# Patient Record
Sex: MALE | Race: OTHER | HISPANIC OR LATINO | Employment: FULL TIME | URBAN - METROPOLITAN AREA
[De-identification: names, ages, dates, MRNs, and addresses within clinical notes are randomized per-mention and may not be internally consistent; named-entity substitution may affect disease eponyms.]

---

## 2017-01-06 ENCOUNTER — HOSPITAL ENCOUNTER (EMERGENCY)
Facility: HOSPITAL | Age: 47
Discharge: HOME | End: 2017-01-06
Admitting: EMERGENCY MEDICINE
Payer: COMMERCIAL

## 2017-01-06 VITALS
WEIGHT: 198 LBS | TEMPERATURE: 97.9 F | HEART RATE: 81 BPM | RESPIRATION RATE: 15 BRPM | HEIGHT: 72 IN | DIASTOLIC BLOOD PRESSURE: 77 MMHG | SYSTOLIC BLOOD PRESSURE: 128 MMHG | OXYGEN SATURATION: 99 % | BODY MASS INDEX: 26.82 KG/M2

## 2017-01-06 DIAGNOSIS — B02.9 HERPES ZOSTER: Primary | ICD-10-CM

## 2017-01-06 PROCEDURE — 99282 EMERGENCY DEPT VISIT SF MDM: CPT

## 2017-01-06 RX ORDER — VALACYCLOVIR HYDROCHLORIDE 1 G/1
1000 TABLET, FILM COATED ORAL EVERY 8 HOURS
Qty: 21 TABLET | Refills: 0 | Status: SHIPPED | OUTPATIENT
Start: 2017-01-06 | End: 2017-12-26

## 2017-01-06 RX ORDER — VALACYCLOVIR HYDROCHLORIDE 500 MG/1
1000 TABLET, FILM COATED ORAL ONCE
Status: COMPLETED | OUTPATIENT
Start: 2017-01-06 | End: 2017-01-06

## 2017-01-06 RX ADMIN — VALACYCLOVIR 1000 MG: 500 TABLET, FILM COATED ORAL at 21:51

## 2017-12-26 ENCOUNTER — APPOINTMENT (EMERGENCY)
Dept: CT IMAGING | Facility: HOSPITAL | Age: 47
End: 2017-12-26
Payer: COMMERCIAL

## 2017-12-26 ENCOUNTER — HOSPITAL ENCOUNTER (OUTPATIENT)
Facility: HOSPITAL | Age: 47
Setting detail: OBSERVATION
Discharge: HOME | End: 2017-12-26
Attending: EMERGENCY MEDICINE | Admitting: FAMILY MEDICINE
Payer: COMMERCIAL

## 2017-12-26 ENCOUNTER — APPOINTMENT (OUTPATIENT)
Dept: NON INVASIVE DIAGNOSTICS | Facility: HOSPITAL | Age: 47
End: 2017-12-26
Attending: FAMILY MEDICINE
Payer: COMMERCIAL

## 2017-12-26 VITALS
BODY MASS INDEX: 26.28 KG/M2 | SYSTOLIC BLOOD PRESSURE: 110 MMHG | OXYGEN SATURATION: 97 % | HEART RATE: 71 BPM | RESPIRATION RATE: 18 BRPM | WEIGHT: 194 LBS | TEMPERATURE: 97.1 F | DIASTOLIC BLOOD PRESSURE: 73 MMHG | HEIGHT: 72 IN

## 2017-12-26 DIAGNOSIS — Z72.0 TOBACCO USE: ICD-10-CM

## 2017-12-26 DIAGNOSIS — R07.9 CHEST PAIN, UNSPECIFIED TYPE: Primary | ICD-10-CM

## 2017-12-26 PROBLEM — J45.909 ASTHMA: Status: ACTIVE | Noted: 2017-12-26

## 2017-12-26 LAB
ALBUMIN SERPL BCP-MCNC: 3.9 G/DL (ref 3.5–5)
ALP SERPL-CCNC: 65 U/L (ref 46–116)
ALT SERPL W P-5'-P-CCNC: 38 U/L (ref 12–78)
AMPHETAMINES SERPL QL SCN: NEGATIVE
ANION GAP SERPL CALCULATED.3IONS-SCNC: 9 MMOL/L (ref 4–13)
ANION GAP SERPL CALCULATED.3IONS-SCNC: 9 MMOL/L (ref 4–13)
APTT PPP: 28 SECONDS (ref 23–35)
AST SERPL W P-5'-P-CCNC: 27 U/L (ref 5–45)
ATRIAL RATE: 57 BPM
ATRIAL RATE: 61 BPM
BARBITURATES UR QL: NEGATIVE
BASOPHILS # BLD AUTO: 0.05 THOUSANDS/ΜL (ref 0–0.1)
BASOPHILS NFR BLD AUTO: 1 % (ref 0–1)
BENZODIAZ UR QL: NEGATIVE
BILIRUB SERPL-MCNC: 0.29 MG/DL (ref 0.2–1)
BUN SERPL-MCNC: 20 MG/DL (ref 5–25)
BUN SERPL-MCNC: 22 MG/DL (ref 5–25)
CALCIUM SERPL-MCNC: 8.7 MG/DL (ref 8.3–10.1)
CALCIUM SERPL-MCNC: 9.4 MG/DL (ref 8.3–10.1)
CHLORIDE SERPL-SCNC: 104 MMOL/L (ref 100–108)
CHLORIDE SERPL-SCNC: 106 MMOL/L (ref 100–108)
CHOLEST SERPL-MCNC: 180 MG/DL (ref 50–200)
CK MB SERPL-MCNC: 2.1 % (ref 0–2.5)
CK MB SERPL-MCNC: 6.1 NG/ML (ref 0–5)
CK SERPL-CCNC: 285 U/L (ref 39–308)
CO2 SERPL-SCNC: 24 MMOL/L (ref 21–32)
CO2 SERPL-SCNC: 26 MMOL/L (ref 21–32)
COCAINE UR QL: NEGATIVE
CREAT SERPL-MCNC: 0.91 MG/DL (ref 0.6–1.3)
CREAT SERPL-MCNC: 1.02 MG/DL (ref 0.6–1.3)
EOSINOPHIL # BLD AUTO: 0.28 THOUSAND/ΜL (ref 0–0.61)
EOSINOPHIL NFR BLD AUTO: 3 % (ref 0–6)
ERYTHROCYTE [DISTWIDTH] IN BLOOD BY AUTOMATED COUNT: 13.2 % (ref 11.6–15.1)
ERYTHROCYTE [DISTWIDTH] IN BLOOD BY AUTOMATED COUNT: 13.4 % (ref 11.6–15.1)
GFR SERPL CREATININE-BSD FRML MDRD: 100 ML/MIN/1.73SQ M
GFR SERPL CREATININE-BSD FRML MDRD: 87 ML/MIN/1.73SQ M
GLUCOSE P FAST SERPL-MCNC: 102 MG/DL (ref 65–99)
GLUCOSE SERPL-MCNC: 102 MG/DL (ref 65–140)
GLUCOSE SERPL-MCNC: 106 MG/DL (ref 65–140)
GLUCOSE SERPL-MCNC: 113 MG/DL (ref 65–140)
GLUCOSE SERPL-MCNC: 88 MG/DL (ref 65–140)
HCT VFR BLD AUTO: 41.7 % (ref 36.5–49.3)
HCT VFR BLD AUTO: 44.9 % (ref 36.5–49.3)
HDLC SERPL-MCNC: 34 MG/DL (ref 40–60)
HGB BLD-MCNC: 14.3 G/DL (ref 12–17)
HGB BLD-MCNC: 15.6 G/DL (ref 12–17)
INR PPP: 0.88 (ref 0.86–1.16)
LDLC SERPL CALC-MCNC: 100 MG/DL (ref 0–100)
LYMPHOCYTES # BLD AUTO: 4.61 THOUSANDS/ΜL (ref 0.6–4.47)
LYMPHOCYTES NFR BLD AUTO: 47 % (ref 14–44)
MAGNESIUM SERPL-MCNC: 1.9 MG/DL (ref 1.6–2.6)
MCH RBC QN AUTO: 31.2 PG (ref 26.8–34.3)
MCH RBC QN AUTO: 31.5 PG (ref 26.8–34.3)
MCHC RBC AUTO-ENTMCNC: 34.3 G/DL (ref 31.4–37.4)
MCHC RBC AUTO-ENTMCNC: 34.7 G/DL (ref 31.4–37.4)
MCV RBC AUTO: 91 FL (ref 82–98)
MCV RBC AUTO: 91 FL (ref 82–98)
METHADONE UR QL: NEGATIVE
MONOCYTES # BLD AUTO: 0.72 THOUSAND/ΜL (ref 0.17–1.22)
MONOCYTES NFR BLD AUTO: 8 % (ref 4–12)
NEUTROPHILS # BLD AUTO: 4 THOUSANDS/ΜL (ref 1.85–7.62)
NEUTS SEG NFR BLD AUTO: 41 % (ref 43–75)
NRBC BLD AUTO-RTO: 0 /100 WBCS
OPIATES UR QL SCN: NEGATIVE
P AXIS: 39 DEGREES
P AXIS: 40 DEGREES
PCP UR QL: NEGATIVE
PLATELET # BLD AUTO: 209 THOUSANDS/UL (ref 149–390)
PLATELET # BLD AUTO: 232 THOUSANDS/UL (ref 149–390)
PMV BLD AUTO: 10.4 FL (ref 8.9–12.7)
PMV BLD AUTO: 9.9 FL (ref 8.9–12.7)
POTASSIUM SERPL-SCNC: 3.8 MMOL/L (ref 3.5–5.3)
POTASSIUM SERPL-SCNC: 4.1 MMOL/L (ref 3.5–5.3)
PR INTERVAL: 128 MS
PR INTERVAL: 134 MS
PROT SERPL-MCNC: 8 G/DL (ref 6.4–8.2)
PROTHROMBIN TIME: 11.9 SECONDS (ref 12.1–14.4)
QRS AXIS: 74 DEGREES
QRS AXIS: 75 DEGREES
QRSD INTERVAL: 86 MS
QRSD INTERVAL: 88 MS
QT INTERVAL: 390 MS
QT INTERVAL: 404 MS
QTC INTERVAL: 392 MS
QTC INTERVAL: 393 MS
RBC # BLD AUTO: 4.58 MILLION/UL (ref 3.88–5.62)
RBC # BLD AUTO: 4.95 MILLION/UL (ref 3.88–5.62)
SODIUM SERPL-SCNC: 139 MMOL/L (ref 136–145)
SODIUM SERPL-SCNC: 139 MMOL/L (ref 136–145)
SPECIMEN SOURCE: NORMAL
T WAVE AXIS: 36 DEGREES
T WAVE AXIS: 62 DEGREES
THC UR QL: NEGATIVE
TRIGL SERPL-MCNC: 228 MG/DL
TROPONIN I BLD-MCNC: 0.01 NG/ML (ref 0–0.08)
TROPONIN I SERPL-MCNC: <0.02 NG/ML
TROPONIN I SERPL-MCNC: <0.02 NG/ML
VENTRICULAR RATE: 57 BPM
VENTRICULAR RATE: 61 BPM
WBC # BLD AUTO: 9.66 THOUSAND/UL (ref 4.31–10.16)
WBC # BLD AUTO: 9.81 THOUSAND/UL (ref 4.31–10.16)

## 2017-12-26 PROCEDURE — 85610 PROTHROMBIN TIME: CPT | Performed by: NURSE PRACTITIONER

## 2017-12-26 PROCEDURE — 74175 CTA ABDOMEN W/CONTRAST: CPT

## 2017-12-26 PROCEDURE — 71275 CT ANGIOGRAPHY CHEST: CPT

## 2017-12-26 PROCEDURE — 99285 EMERGENCY DEPT VISIT HI MDM: CPT

## 2017-12-26 PROCEDURE — 93017 CV STRESS TEST TRACING ONLY: CPT

## 2017-12-26 PROCEDURE — 85730 THROMBOPLASTIN TIME PARTIAL: CPT | Performed by: NURSE PRACTITIONER

## 2017-12-26 PROCEDURE — 96360 HYDRATION IV INFUSION INIT: CPT

## 2017-12-26 PROCEDURE — 93005 ELECTROCARDIOGRAM TRACING: CPT

## 2017-12-26 PROCEDURE — 83735 ASSAY OF MAGNESIUM: CPT | Performed by: NURSE PRACTITIONER

## 2017-12-26 PROCEDURE — 93005 ELECTROCARDIOGRAM TRACING: CPT | Performed by: NURSE PRACTITIONER

## 2017-12-26 PROCEDURE — 85027 COMPLETE CBC AUTOMATED: CPT | Performed by: PHYSICIAN ASSISTANT

## 2017-12-26 PROCEDURE — 84484 ASSAY OF TROPONIN QUANT: CPT | Performed by: PHYSICIAN ASSISTANT

## 2017-12-26 PROCEDURE — 82553 CREATINE MB FRACTION: CPT | Performed by: NURSE PRACTITIONER

## 2017-12-26 PROCEDURE — 80307 DRUG TEST PRSMV CHEM ANLYZR: CPT | Performed by: NURSE PRACTITIONER

## 2017-12-26 PROCEDURE — 84484 ASSAY OF TROPONIN QUANT: CPT

## 2017-12-26 PROCEDURE — 80053 COMPREHEN METABOLIC PANEL: CPT | Performed by: NURSE PRACTITIONER

## 2017-12-26 PROCEDURE — 85025 COMPLETE CBC W/AUTO DIFF WBC: CPT | Performed by: NURSE PRACTITIONER

## 2017-12-26 PROCEDURE — 82550 ASSAY OF CK (CPK): CPT | Performed by: NURSE PRACTITIONER

## 2017-12-26 PROCEDURE — 82948 REAGENT STRIP/BLOOD GLUCOSE: CPT

## 2017-12-26 PROCEDURE — 80061 LIPID PANEL: CPT | Performed by: PHYSICIAN ASSISTANT

## 2017-12-26 PROCEDURE — 36415 COLL VENOUS BLD VENIPUNCTURE: CPT | Performed by: NURSE PRACTITIONER

## 2017-12-26 PROCEDURE — 80048 BASIC METABOLIC PNL TOTAL CA: CPT | Performed by: PHYSICIAN ASSISTANT

## 2017-12-26 PROCEDURE — 96361 HYDRATE IV INFUSION ADD-ON: CPT

## 2017-12-26 RX ORDER — ONDANSETRON 2 MG/ML
4 INJECTION INTRAMUSCULAR; INTRAVENOUS EVERY 6 HOURS PRN
Status: DISCONTINUED | OUTPATIENT
Start: 2017-12-26 | End: 2017-12-26 | Stop reason: HOSPADM

## 2017-12-26 RX ORDER — NICOTINE 21 MG/24HR
1 PATCH, TRANSDERMAL 24 HOURS TRANSDERMAL DAILY
Qty: 28 PATCH | Refills: 0 | Status: SHIPPED | OUTPATIENT
Start: 2017-12-27 | End: 2019-04-05

## 2017-12-26 RX ORDER — NITROGLYCERIN 0.4 MG/1
0.4 TABLET SUBLINGUAL
COMMUNITY
End: 2017-12-26 | Stop reason: HOSPADM

## 2017-12-26 RX ORDER — ATORVASTATIN CALCIUM 40 MG/1
40 TABLET, FILM COATED ORAL
Status: DISCONTINUED | OUTPATIENT
Start: 2017-12-26 | End: 2017-12-26

## 2017-12-26 RX ORDER — ASPIRIN 81 MG/1
81 TABLET ORAL DAILY
Qty: 30 TABLET | Refills: 0 | Status: SHIPPED | OUTPATIENT
Start: 2017-12-26 | End: 2019-04-05

## 2017-12-26 RX ORDER — NICOTINE 21 MG/24HR
1 PATCH, TRANSDERMAL 24 HOURS TRANSDERMAL DAILY
Status: DISCONTINUED | OUTPATIENT
Start: 2017-12-26 | End: 2017-12-26 | Stop reason: HOSPADM

## 2017-12-26 RX ORDER — CALCIUM CARBONATE 200(500)MG
1000 TABLET,CHEWABLE ORAL DAILY PRN
Status: DISCONTINUED | OUTPATIENT
Start: 2017-12-26 | End: 2017-12-26 | Stop reason: HOSPADM

## 2017-12-26 RX ORDER — ASPIRIN 81 MG/1
81 TABLET, CHEWABLE ORAL DAILY
COMMUNITY
End: 2017-12-26 | Stop reason: HOSPADM

## 2017-12-26 RX ORDER — PANTOPRAZOLE SODIUM 40 MG/1
40 TABLET, DELAYED RELEASE ORAL DAILY
Qty: 14 TABLET | Refills: 0 | Status: SHIPPED | OUTPATIENT
Start: 2017-12-26 | End: 2019-04-05

## 2017-12-26 RX ORDER — ASPIRIN 81 MG/1
81 TABLET, CHEWABLE ORAL DAILY
Status: DISCONTINUED | OUTPATIENT
Start: 2017-12-26 | End: 2017-12-26 | Stop reason: HOSPADM

## 2017-12-26 RX ORDER — NITROGLYCERIN 0.4 MG/1
0.4 TABLET SUBLINGUAL
Status: DISCONTINUED | OUTPATIENT
Start: 2017-12-26 | End: 2017-12-26 | Stop reason: HOSPADM

## 2017-12-26 RX ORDER — NITROGLYCERIN 0.4 MG/1
0.4 TABLET SUBLINGUAL ONCE
Status: COMPLETED | OUTPATIENT
Start: 2017-12-26 | End: 2017-12-26

## 2017-12-26 RX ORDER — ACETAMINOPHEN 325 MG/1
650 TABLET ORAL EVERY 6 HOURS PRN
Status: DISCONTINUED | OUTPATIENT
Start: 2017-12-26 | End: 2017-12-26 | Stop reason: HOSPADM

## 2017-12-26 RX ADMIN — NITROGLYCERIN 1 INCH: 20 OINTMENT TOPICAL at 03:01

## 2017-12-26 RX ADMIN — ENOXAPARIN SODIUM 40 MG: 40 INJECTION SUBCUTANEOUS at 08:52

## 2017-12-26 RX ADMIN — IOHEXOL 100 ML: 350 INJECTION, SOLUTION INTRAVENOUS at 03:28

## 2017-12-26 RX ADMIN — SODIUM CHLORIDE 1000 ML: 0.9 INJECTION, SOLUTION INTRAVENOUS at 02:23

## 2017-12-26 RX ADMIN — ASPIRIN 81 MG 81 MG: 81 TABLET ORAL at 08:52

## 2017-12-26 RX ADMIN — NITROGLYCERIN 0.4 MG: 0.4 TABLET SUBLINGUAL at 02:17

## 2017-12-26 NOTE — CASE MANAGEMENT
Initial Clinical Review    Admission: Date/Time/Statement:  OBS 12/26 @ 0409    Orders Placed This Encounter   Procedures    Place in Observation (expected length of stay for this patient is less than two midnights)     Standing Status:   Standing     Number of Occurrences:   1     Order Specific Question:   Admitting Physician     Answer:   Mickey Howard [[id number]]     Order Specific Question:   Level of Care     Answer:   Med Surg [16]       ED: Date/Time/Mode of Arrival:   ED Arrival Information     Expected Arrival Acuity Means of Arrival Escorted By Service Admission Type    - 12/26/2017 01:55 Urgent Ambulance [facility] General Medicine Urgent    Arrival Complaint    -        Chief Complaint:   Chief Complaint   Patient presents with    Chest Pain     Pt reports chest pain started suddenly an hour ago  Pt reports he was seen at [place] Saturday for chest pain, discharged with nitroglycerin and told to follow up with cardiology  Pt reports nausea and dizziness  History of Illness: 52year old male who states was at [facility] Saturday night and was d/c Sunday  He states he was told he needed to have a stress test on 12/26/17  He states he was given nitro sl tabs to take at home if needed  He states that while playing x box tonight he developed left central chest pain that goes into his back  He states that he was nauseated  He states he took 1 sl nitro and pain went to a 3/10 pain  He states he took 2 aspirin at home and EMS had given him 3 more  Pt states he is short of breath and has pain with inspiration  He denies any hx of DVT or PE  Denies any recent travel  He states he has been getting pain in the back of both of his legs recently but denies swelling  Pt states that he thinks they saw something abnormal on his EKG  He states his friends told him to come to [facility]  + smoker    + weekend drinker but denies drinking today           ED Vital Signs:   ED Triage Vitals [12/26/17 0203]   Temperature Pulse Respirations Blood Pressure SpO2   98 2 °F (36 8 °C) 67 16 142/87 97 %      Temp Source Heart Rate Source Patient Position - Orthostatic VS BP Location FiO2 (%)   Oral Monitor Lying Right arm --      Pain Score       1        Wt Readings from Last 1 Encounters:   12/26/17 88 kg (194 lb 0 1 oz)       Abnormal Labs/Diagnostic Test Results: PT 11 9,   Abs lymphs 4 61  CTA chest/abd: No CT evidence of aortic dissection  EKG: Sinus bradycardia    ED Treatment:   Medication Administration from 12/26/2017 0154 to 12/26/2017 0443       Date/Time Order Dose Route Action Action by Comments     12/26/2017 0410 sodium chloride 0 9 % bolus 1,000 mL 0 mL Intravenous Stopped Sam Abraham RN      12/26/2017 0223 sodium chloride 0 9 % bolus 1,000 mL 1,000 mL Intravenous Toantdeloris 37 Sam Abraham [facility]      12/26/2017 0217 nitroglycerin (NITROSTAT) SL tablet 0 4 mg 0 4 mg Sublingual Given Sam Abraham RN      12/26/2017 0301 nitroglycerin (NITRO-BID) 2 % TD ointment 1 inch 1 inch Topical Given Sam Abraham RN      12/26/2017 0328 iohexol (OMNIPAQUE) 350 MG/ML injection (SINGLE-DOSE) 100 mL 100 mL Intravenous Given Yoko Galvan           Past Medical/Surgical History: Active Ambulatory Problems     Diagnosis Date Noted    No Active Ambulatory Problems     Resolved Ambulatory Problems     Diagnosis Date Noted    No Resolved Ambulatory Problems     Past Medical History:   Diagnosis Date    Asthma        Admitting Diagnosis: Chest pain [R07 9]  Tobacco use [Z72 0]  Chest pain, unspecified type [R07 9]    Age/Sex: 52 y o  male    Assessment/Plan: Chest pain  Active Problems:    Asthma    Tobacco abuse     Plan for the Primary Problem(s):  · Chest pain         ? Admit to med/surg on telemetry  trend troponin and serial EKG  Continua ASA  Consult cardiology   Order stress test       Plan for Additional Problems:   · Asthma- does not require inhalers at home  · Tobacco use- nicotine patch ordered     VTE Prophylaxis: Enoxaparin (Lovenox)  / sequential compression device   Code Status: full code  POLST: There is no POLST form on file for this patient (pre-hospital)     Anticipated Length of Stay:  Patient will be admitted on an Observation basis with an anticipated length of stay of  Less than 2 midnights  Justification for Hospital Stay: patient requires stress test and troponin    Admission Orders:  OBS  TELE  EKG with 2nd and 3rd trops  EKG with CP or st changes  Trops x 3  Consult Cardio  SCD's  Stress TEst    Scheduled Meds:   aspirin 81 mg Oral Daily   atorvastatin 40 mg Oral Daily With Dinner   enoxaparin 40 mg Subcutaneous Daily   nicotine 1 patch Transdermal Daily     Continuous Infusions:    PRN Meds:   acetaminophen    calcium carbonate    nitroglycerin    ondansetron    Trops neg  Thank you,  [address] [facility] in the [facility] by [facility] Erazo for 2017  Network Utilization Review Department  Phone: [phone]; Fax [phone]  ATTENTION: The Network Utilization Review Department is now centralized for our 7 Facilities  Make a note that we have a new phone and fax numbers for our Department  Please call with any questions or concerns to [phone] and carefully follow the prompts so that you are directed to the right person  All voicemails are confidential  Fax any determinations, approvals, denials, and requests for initial or continue stay review clinical to [phone]  Due to HIGH CALL volume, it would be easier if you could please send faxed requests to expedite your requests and in part, help us provide discharge notifications faster

## 2017-12-26 NOTE — PLAN OF CARE
CARDIOVASCULAR - ADULT     Maintains optimal cardiac output and hemodynamic stability Completed     Absence of cardiac dysrhythmias or at baseline rhythm Completed        DISCHARGE PLANNING     Discharge to home or other facility with appropriate resources Completed        INFECTION - ADULT     Absence or prevention of progression during hospitalization Completed     Absence of fever/infection during neutropenic period Completed        Knowledge Deficit     Patient/family/caregiver demonstrates understanding of disease process, treatment plan, medications, and discharge instructions Completed        PAIN - ADULT     Verbalizes/displays adequate comfort level or baseline comfort level Completed        SAFETY ADULT     Patient will remain free of falls Completed     Maintain or return to baseline ADL function Completed     Maintain or return mobility status to optimal level Completed

## 2017-12-26 NOTE — H&P
History and Physical - [facility] Internal Medicine    Patient Information: Kameron Kincaid 52 y o  male MRN: [id number]  Unit/Bed#: E4 -01 Encounter: [id number]  Admitting Physician: Doris Henao PA-C  PCP: No primary care provider on file  Date of Admission:  12/26/17    Assessment/Plan:    Hospital Problem List:     Principal Problem:    Chest pain  Active Problems:    Asthma    Tobacco abuse      Plan for the Primary Problem(s):  · Chest pain   · Admit to med/surg on telemetry  trend troponin and serial EKG  Continua ASA  Consult cardiology  Order stress test      Plan for Additional Problems:   · Asthma- does not require inhalers at home  · Tobacco use- nicotine patch ordered    VTE Prophylaxis: Enoxaparin (Lovenox)  / sequential compression device   Code Status: full code  POLST: There is no POLST form on file for this patient (pre-hospital)    Anticipated Length of Stay:  Patient will be admitted on an Observation basis with an anticipated length of stay of  Less than 2 midnights  Justification for Hospital Stay: patient requires stress test and troponin    Total Time for Visit, including Counseling / Coordination of Care: 45 minutes  Greater than 50% of this total time spent on direct patient counseling and coordination of care  Chief Complaint:   Chest pain    History of Present Illness:    Kameron Kincaid is a 52 y o  male who presents with chest pain that started around 1am while he was playing x-box  He states pain was centrally located  Did not radiate  Was associated with nausea but no diaphoresis  It was not related to food  He took 2 nitro and ASA  He did have a similar episode 2 days ago and was admitted to [facility]  He was told he needed a stress test  He has a family history of his father having an MI in his 42's that he survived  Review of Systems:    Review of Systems   Constitutional: Negative  HENT: Negative  Eyes: Negative  Respiratory: Negative  Cardiovascular: Positive for chest pain  Gastrointestinal: Positive for nausea  Endocrine: Negative  Genitourinary: Negative  Musculoskeletal: Negative  Skin: Negative  Allergic/Immunologic: Negative  Neurological: Negative  Hematological: Negative  Psychiatric/Behavioral: Negative  Past Medical and Surgical History:     Past Medical History:   Diagnosis Date    Asthma        History reviewed  No pertinent surgical history  Meds/Allergies:    Prior to Admission medications    Medication Sig Start Date End Date Taking? Authorizing Provider   aspirin 81 mg chewable tablet Chew 81 mg daily   Yes Historical Provider, MD   nitroglycerin (NITROSTAT) 0 4 mg SL tablet Place 0 4 mg under the tongue every 5 (five) minutes as needed for chest pain   Yes Historical Provider, MD   valACYclovir (VALTREX) 1,000 mg tablet Take 1 tablet by mouth every 8 (eight) hours for 7 days 1/6/17 12/26/17  Carl Thompson,      I have reviewed home medications with patient personally  Allergies:    Allergies   Allergen Reactions    Doxycycline Hives       Social History:     Marital Status: Single   Occupation:   Patient Pre-hospital Living Situation: lives with room mates  Patient Pre-hospital Level of Mobility: no restrictions  Patient Pre-hospital Diet Restrictions: none  Substance Use History:   History   Alcohol Use    Yes     Comment: social     History   Smoking Status    Current Every Day Smoker    Packs/day: 1 00   Smokeless Tobacco    Never Used     History   Drug Use No       Family History:    non-contributory    Physical Exam:     Vitals:   Blood Pressure: 97/63 (12/26/17 0500)  Pulse: (!) 52 (12/26/17 0500)  Temperature: 98 2 °F (36 8 °C) (12/26/17 0500)  Temp Source: Temporal (12/26/17 0500)  Respirations: 18 (12/26/17 0500)  Height: 6' (182 9 cm) (12/26/17 0416)  Weight - Scale: 88 kg (194 lb 0 1 oz) (12/26/17 0416)  SpO2: 97 % (12/26/17 0500)    Physical Exam Constitutional: He is oriented to person, place, and time  He appears well-developed and well-nourished  No distress  HENT:   Head: Normocephalic and atraumatic  Eyes: Conjunctivae are normal  Pupils are equal, round, and reactive to light  Neck: Normal range of motion  Neck supple  No JVD present  No tracheal deviation present  No thyromegaly present  Cardiovascular: Normal rate and regular rhythm  Pulmonary/Chest: Effort normal and breath sounds normal  No respiratory distress  He has no wheezes  Abdominal: Soft  Bowel sounds are normal  He exhibits no distension and no mass  There is no tenderness  There is no rebound and no guarding  Musculoskeletal: Normal range of motion  He exhibits no edema, tenderness or deformity  Lymphadenopathy:     He has no cervical adenopathy  Neurological: He is alert and oriented to person, place, and time  Skin: Skin is warm and dry  No rash noted  He is not diaphoretic  No erythema  No pallor  Psychiatric: He has a normal mood and affect  His behavior is normal    Vitals reviewed  Additional Data:     Lab Results: I have personally reviewed pertinent reports  Results from last 7 days  Lab Units 12/26/17  0508 12/26/17 0217   WBC Thousand/uL 9 81 9 66   HEMOGLOBIN g/dL 14 3 15 6   HEMATOCRIT % 41 7 44 9   PLATELETS Thousands/uL 209 232   NEUTROS PCT %  --  41*   LYMPHS PCT %  --  47*   MONOS PCT %  --  8   EOS PCT %  --  3       Results from last 7 days  Lab Units 12/26/17  0508 12/26/17 0217   SODIUM mmol/L 139 139   POTASSIUM mmol/L 4 1 3 8   CHLORIDE mmol/L 106 104   CO2 mmol/L 24 26   BUN mg/dL 20 22   CREATININE mg/dL 0 91 1 02   CALCIUM mg/dL 8 7 9 4   TOTAL PROTEIN g/dL  --  8 0   BILIRUBIN TOTAL mg/dL  --  0 29   ALK PHOS U/L  --  65   ALT U/L  --  38   AST U/L  --  27   GLUCOSE RANDOM mg/dL 102 113       Results from last 7 days  Lab Units 12/26/17 0217   INR  0 88       Imaging: I have personally reviewed pertinent reports  No results found  EKG, Pathology, and Other Studies Reviewed on Admission:   · EKG: no ischemic changes    Allscripts / Epic Records Reviewed: Yes     ** Please Note: This note has been constructed using a voice recognition system   **

## 2017-12-26 NOTE — ED PROVIDER NOTES
History  Chief Complaint   Patient presents with    Chest Pain     Pt reports chest pain started suddenly an hour ago  Pt reports he was seen at [facility] Saturday for chest pain, discharged with nitroglycerin and told to follow up with cardiology  Pt reports nausea and dizziness  This is a 52year old male who states was at [facility] Saturday night and was d/c Sunday  He states he was told he needed to have a stress test on 12/26/17  He states he was given nitro sl tabs to take at home if needed  He states that while playing x box tonight he developed left central chest pain that goes into his back  He states that he was nauseated  He states he took 1 sl nitro and pain went to a 3/10 pain  He states he took 2 aspirin at home and EMS had given him 3 more  Pt states he is short of breath and has pain with inspiration  He denies any hx of DVT or PE  Denies any recent travel  He states he has been getting pain in the back of both of his legs recently but denies swelling  Pt states that he thinks they saw something abnormal on his EKG  He states his friends told him to come to [facility]  + smoker  + weekend drinker but denies drinking today  History provided by:  Patient and medical records   used: No    Chest Pain   Pain location:  Substernal area  Pain quality: aching and pressure    Pain radiates to:  Mid back  Pain radiates to the back: yes    Pain severity:  Moderate  Timing:  Constant  Chronicity:  Recurrent  Context: breathing    Relieved by:  Nitroglycerin  Worsened by:  Deep breathing  Associated symptoms: back pain, diaphoresis, dizziness, nausea and shortness of breath    Risk factors: male sex and smoking    Risk factors: no prior DVT/PE and no surgery        Prior to Admission Medications   Prescriptions Last Dose Informant Patient Reported?  Taking?   aspirin 81 mg chewable tablet   Yes Yes   Sig: Chew 81 mg daily   nitroglycerin (NITROSTAT) 0 4 mg SL tablet   Yes Yes   Sig: Place 0 4 mg under the tongue every 5 (five) minutes as needed for chest pain      Facility-Administered Medications: None       Past Medical History:   Diagnosis Date    Asthma        History reviewed  No pertinent surgical history  History reviewed  No pertinent family history  I have reviewed and agree with the history as documented  Social History   Substance Use Topics    Smoking status: Current Every Day Smoker     Packs/day: 1 00    Smokeless tobacco: Never Used    Alcohol use Yes      Comment: social        Review of Systems   Constitutional: Positive for diaphoresis  HENT: Negative  Eyes: Negative  Respiratory: Positive for shortness of breath  Cardiovascular: Positive for chest pain  Gastrointestinal: Positive for nausea  Endocrine: Negative  Genitourinary: Negative  Musculoskeletal: Positive for back pain  Skin: Negative  Allergic/Immunologic: Negative  Neurological: Positive for dizziness  Hematological: Negative  Psychiatric/Behavioral: Negative  Physical Exam  ED Triage Vitals [12/26/17 0203]   Temperature Pulse Respirations Blood Pressure SpO2   98 2 °F (36 8 °C) 67 16 142/87 97 %      Temp Source Heart Rate Source Patient Position - Orthostatic VS BP Location FiO2 (%)   Oral Monitor Lying Right arm --      Pain Score       1           Orthostatic Vital Signs  Vitals:    12/26/17 0203 12/26/17 0311 12/26/17 0312   BP: 142/87 106/73 115/78   Pulse: 67     Patient Position - Orthostatic VS: Lying Lying Lying       Physical Exam   Constitutional: He appears well-developed and well-nourished  No distress  Pt clothing smells of cigarette smoke    HENT:   Head: Normocephalic and atraumatic  Eyes: EOM are normal  Pupils are equal, round, and reactive to light  Neck: Normal range of motion  Neck supple  Cardiovascular: Normal rate, regular rhythm and normal heart sounds      Pulmonary/Chest: Effort normal and breath sounds normal  No respiratory distress  He has no wheezes  He has no rales  He exhibits no tenderness  Abdominal: Soft  Bowel sounds are normal  He exhibits no distension  There is no tenderness  Musculoskeletal: Normal range of motion  Neurological: He is alert  Skin: Skin is warm and dry  Capillary refill takes less than 2 seconds  He is not diaphoretic  Psychiatric: He has a normal mood and affect  His behavior is normal  Judgment and thought content normal    Nursing note and vitals reviewed        ED Medications  Medications   sodium chloride 0 9 % bolus 1,000 mL (1,000 mL Intravenous New Bag 12/26/17 0223)   nitroglycerin (NITROSTAT) SL tablet 0 4 mg (0 4 mg Sublingual Given 12/26/17 0217)   nitroglycerin (NITRO-BID) 2 % TD ointment 1 inch (1 inch Topical Given 12/26/17 0301)   iohexol (OMNIPAQUE) 350 MG/ML injection (SINGLE-DOSE) 100 mL (100 mL Intravenous Given 12/26/17 0328)       Diagnostic Studies  Results Reviewed     Procedure Component Value Units Date/Time    Magnesium [[id number]]  (Normal) Collected:  12/26/17 0217    Lab Status:  Final result Specimen:  Blood from Arm, Left Updated:  12/26/17 0303     Magnesium 1 9 mg/dL     CKMB [[id number]]  (Abnormal) Collected:  12/26/17 0217    Lab Status:  Final result Specimen:  Blood from Arm, Left Updated:  12/26/17 0303     CK-MB Index 2 1 %      CK-MB FRACTION 6 1 (H) ng/mL     CK Total with Reflex CKMB [[id number]]  (Normal) Collected:  12/26/17 0217    Lab Status:  Final result Specimen:  Blood from Arm, Left Updated:  12/26/17 0302     Total  U/L     Comprehensive metabolic panel [[id number]] Collected:  12/26/17 0217    Lab Status:  Final result Specimen:  Blood from Arm, Left Updated:  12/26/17 0250     Sodium 139 mmol/L      Potassium 3 8 mmol/L      Chloride 104 mmol/L      CO2 26 mmol/L      Anion Gap 9 mmol/L      BUN 22 mg/dL      Creatinine 1 02 mg/dL      Glucose 113 mg/dL      Calcium 9 4 mg/dL      AST 27 U/L      ALT 38 U/L      Alkaline Phosphatase 65 U/L      Total Protein 8 0 g/dL      Albumin 3 9 g/dL      Total Bilirubin 0 29 mg/dL      eGFR 87 ml/min/1 73sq m     Narrative:         National Kidney Disease Education Program recommendations are as follows:  GFR calculation is accurate only with a steady state creatinine  Chronic Kidney disease less than 60 ml/min/1 73 sq  meters  Kidney failure less than 15 ml/min/1 73 sq  meters  Protime-INR [[id number]]  (Abnormal) Collected:  12/26/17 0217    Lab Status:  Final result Specimen:  Blood from Arm, Left Updated:  12/26/17 0238     Protime 11 9 (L) seconds      INR 0 88    APTT [[id number]]  (Normal) Collected:  12/26/17 0217    Lab Status:  Final result Specimen:  Blood from Arm, Left Updated:  12/26/17 0238     PTT 28 seconds     Narrative:          Therapeutic Heparin Range = 60-90 seconds    CBC and differential [[id number]]  (Abnormal) Collected:  12/26/17 0217    Lab Status:  Final result Specimen:  Blood from Arm, Left Updated:  12/26/17 0229     WBC 9 66 Thousand/uL      RBC 4 95 Million/uL      Hemoglobin 15 6 g/dL      Hematocrit 44 9 %      MCV 91 fL      MCH 31 5 pg      MCHC 34 7 g/dL      RDW 13 2 %      MPV 10 4 fL      Platelets 416 Thousands/uL      nRBC 0 /100 WBCs      Neutrophils Relative 41 (L) %      Lymphocytes Relative 47 (H) %      Monocytes Relative 8 %      Eosinophils Relative 3 %      Basophils Relative 1 %      Neutrophils Absolute 4 00 Thousands/µL      Lymphocytes Absolute 4 61 (H) Thousands/µL      Monocytes Absolute 0 72 Thousand/µL      Eosinophils Absolute 0 28 Thousand/µL      Basophils Absolute 0 05 Thousands/µL     POCT troponin [[id number]]  (Normal) Collected:  12/26/17 0201    Lab Status:  Final result Updated:  12/26/17 0214     POC Troponin I 0 01 ng/ml      Specimen Type VENOUS    Narrative:         [employer] i-Stat handheld analyzer 99% cutoff is > 0 08ng/mL in network Emergency Departments    o cTnI 99% cutoff is useful only when applied to patients in the clinical setting of myocardial ischemia  o cTnI 99% cutoff should be interpreted in the context of clinical history, ECG findings and possibly cardiac imaging to establish correct diagnosis  o cTnI 99% cutoff may be suggestive but clearly not indicative of a coronary event without the clinical setting of myocardial ischemia  Rapid drug screen, urine [[id number]]     Lab Status:  No result Specimen:  Urine                  CTA dissection protocol chest and abdomen    (Results Pending)              Procedures  ECG 12 Lead Documentation  Date/Time: 12/26/2017 2:28 AM  Performed by: Sagrario Roberto by: Yessica Lipscomb     Indications / Diagnosis:  Chest pain   ECG reviewed by me, the ED Provider: yes (Dr Milly Goldmann )    Patient location:  ED and bedside  Previous ECG:     Previous ECG:  Unavailable    Comparison to cardiac monitor: No    Interpretation:     Interpretation: normal    Rate:     ECG rate:  61    ECG rate assessment: normal    Rhythm:     Rhythm: sinus rhythm             Phone Contacts  ED Phone Contact    ED Course  ED Course as of Dec 26 0410   Tue Dec 26, 2017   0236 Pt got relief with nitro #2 will place nitro paste  Will have RN get BP in both arms  [id number] Labs reviewed and discussed with pt  Trop #1 0 01  Will order CTA to rule out dissection     0404 Labs and radiology results reviewed and discussed with pt  Explained to pt that concern of cardiac issue  Pt agrees and agrees with POC to stay for possible stress test today  Will speak with JAYRO Manrique 1620 will accept pt for observation status             HEART Risk Score    Flowsheet Row Most Recent Value   History  1 Filed at: 12/26/2017 0314   ECG  0 Filed at: 12/26/2017 0314   Age  1 Filed at: 12/26/2017 0314   Risk Factors  1 Filed at: 12/26/2017 0314   Troponin  0 Filed at: 12/26/2017 0314   Heart Score Risk Calculator   History  1 Filed at: 12/26/2017 0314   ECG  0 Filed at: 12/26/2017 6403   Age  1 Filed at: 2017   Risk Factors  1 Filed at: 2017   Troponin  0 Filed at: 2017   HEART Score  3 Filed at: 2017   HEART Score  3 Filed at: 2017 0255                            [facility]  Number of Diagnoses or Management Options  Diagnosis management comments: Differential diagnosis:  MI, ACS, NSTEMI, STEMI, dissection     Labs  EKG  UDS  Nitro  CTA chest  Admit          Amount and/or Complexity of Data Reviewed  Clinical lab tests: ordered and reviewed  Tests in the radiology section of CPT®: ordered and reviewed  Review and summarize past medical records: yes      CritCare Time    Disposition  Final diagnoses:   Chest pain, unspecified type   Tobacco use     Time reflects when diagnosis was documented in both MDM as applicable and the Disposition within this note     Time User Action Codes Description Comment    2017  4:05 AM Corby Merlos [R07 9] Chest pain, unspecified type     2017  4:05 AM Corby Merlos [Z72 0] Tobacco use       ED Disposition     ED Disposition Condition Comment    Admit  Case was discussed with JAYRO and the patient's admission status was agreed to be Admission Status: observation status to the service of Dr Raquel Barnes    Follow-up Information    None       Patient's Medications   Discharge Prescriptions    No medications on file     No discharge procedures on file  ED Provider  Electronically Signed by    Raymundo Bhatia  Preliminary Radiology Report  Call: [phone]  assistance Online chat: [email or web address]  Patient Name: Prudencio Ivan MRN: [id number]   (Age): 1970 52 Gender: M  Date of Exam: 2017 Accession: [id number]  Referring Physician: Cathie Cantrell # of Images: [id number]  Ordered As: CTA CHEST/ABDOMEN WWO  Page 1 of 2  EXAM:  CT Angiography Chest Without and With Intravenous Contrast  CLINICAL HISTORY:  52years old, male; Pain; Chest pain; Other: Back;  Additional info: Chest and back pain, SOB  TECHNIQUE:  Axial computed tomographic angiography images of the chest without and with intravenous contrast  using pulmonary embolism protocol  Coronal and sagittal reformatted images were created and reviewed  CONTRAST:  100 mL of omni 350 administered intravenously  COMPARISON:  No relevant prior studies available  FINDINGS:  Pulmonary arteries: There is no evidence for PE  Aorta: There is no evidence for thoracic aortic aneurysm or dissection  Lungs: Pulmonary granuloma noted  No mass  Pleural space: Unremarkable  No significant effusion  No pneumothorax  Heart: Unremarkable  No cardiomegaly  No significant pericardial effusion  No evidence of RV  dysfunction  Bones/joints: No acute fracture  No dislocation  Soft tissues: Unremarkable  Lymph nodes: Unremarkable  No enlarged lymph nodes  IMPRESSION:  There is no evidence for thoracic aortic aneurysm or dissection  _______________________________________________  EXAM:  CT Angiography Abdomen Without and With Intravenous Contrast  Vicky Bright Accession: [id number] MRN: [id number]  Preliminary Radiology Report   (QA) DISCREPANCY? If there is a discrepancy between the preliminary and final interpretation, please notify [employer] via [email or web address]  If you do not have access to our QA portal, call our QA team at [phone]  This report is intended only for the use of the referring physician, and only in accordance with law, If you received this in error, call [phone]  Page 2 of 2  EXAM DATE/TIME:  12/26/2017 3:25 AM   CLINICAL HISTORY:  52years old, male; Pain; Chest pain; Other: Back; Additional info: Chest and back pain, SOB  TECHNIQUE:  Axial computed tomographic angiography images of the abdomen without and with intravenous  contrast   Coronal and sagittal reformatted images were created and reviewed    CONTRAST:  100 mL of omni 350 administered intravenously  COMPARISON:  No relevant prior studies available  FINDINGS:  Lower thorax: No acute findings  Aorta: No acute findings  No abdominal aortic aneurysm  No dissection  Celiac trunk and mesenteric arteries: No acute findings  No occlusion or significant stenosis  Renal arteries: No acute findings  No occlusion or significant stenosis  Liver: Hepatic steatosis is present  Gallbladder and bile ducts: Unremarkable  No calcified stones  No ductal dilation  Pancreas: Unremarkable  No ductal dilation  No mass  Spleen: Unremarkable  No splenomegaly  Adrenals: Unremarkable  No mass  Kidneys and ureters: Unremarkable  No obstructing stones  No hydronephrosis  No solid mass  Stomach and bowel: Unremarkable  No obstruction  No mucosal thickening  Intraperitoneal space: Unremarkable  No significant fluid collection  No free air  Bones/joints: No acute fracture  No dislocation  Soft tissues: Unremarkable  No mass  Lymph nodes: Shotty nonspecific retroperitoneal lymphadenopathy is noted    IMPRESSION:  No acute findings       Kishor Graft, CRNP  12/26/17 0469

## 2017-12-26 NOTE — CONSULTS
Consult - Cardiology   Angelika Adarsh 52 y o  male MRN: [id number]  Unit/Bed#: E4 -01 Encounter: [id number]          Reason For Consult:  Chest pain, palpitations    History Of Present Illness: The patient is a 70-year-old male with a history of smoking 1 pack of cigarettes per day  He does have a family history of premature CAD in his father who is still living  He does not have hypertension  The patient presents with a 1 month history of sharp chest pain which is usually accompanied by palpitations  He does get lightheadedness with this  He was seen in the [facility] Emergency room recently and discharged  He was told he needed a stress test   He had similar symptoms early today and came to the emergency room  He is now pain-free  He does not necessarily get exertional chest pain or shortness of breath but does get some shortness of breath at times  He denies peripheral edema  Past Medical History:   Low HDL cholesterol  Smoking history      Allergy:  Allergies   Allergen Reactions    Doxycycline Hives       Medications:  None    Family History:  Premature CAD in the patient's father    Social History:  Smokes 1 pack of cigarettes per day  Occasional alcohol use    ROS:  No TIAs or claudication    Exam:  103/63  Pulse 60  General:  Anxious middle-age male in no acute distress  Head: Normocephalic, atraumatic  Eyes:  No Icterus  Normal Conjunctiva  Oropharynx: normal-appearing mucosa and no pharyngitis, no exudate  Neck: supple, symmetrical, trachea midline, thyroid: not enlarged, symmetric, no tenderness/mass/nodules, no carotid bruit and no JVD   Heart: RRR, No: murmer, rub or gallop,   Lungs: normal air entry, lungs clear to auscultation and no rales, rhonchi or wheezing  Abdomen:  Nontender without mass organomegaly  Lower Limbs:  No edema   Pulses intact    EKG:  Normal  Troponins:  Normal  Potassium 4 1, BUN 20, creatinine 0 91  Hemoglobin 14 3, white blood count 9 81, platelets [id number]  CT of the chest-normal      ASSESSMENT AND PLAN:   1  Atypical chest pain  Doubt angina  Stress test for later today  Troponins are normal as is the patient's EKG  Does have palpitations with the pain which raises the possibility of an arrhythmia induced chest pain although this is unlikely  If stress test is negative with discharge home  With set patient up for 24 hour Holter monitor and copy the results to me  Will not set up formal follow-up with him unless Holter monitor would be abnormal   2   Low HDL cholesterol  Risk of myocardial infarction/stroke in the next 10 years is 5 7%  This does not indicate statin therapy  Will discontinue atorvastatin  Obviously of CAD present will certainly reinstitute statin  Even know he is a bit shy of 48years of age, would advise use of enteric-coated aspirin 81 mg daily  Also would consider use of empiric proton pump inhibitor for 10-14 days although he does not report symptoms of GERD        Martha Weir MD

## 2017-12-26 NOTE — DISCHARGE SUMMARY
Discharge Summary - [address] [facility]    Patient Information: Crow Jorge 52 y o  male MRN: [id number]  Unit/Bed#: E4 -01 Encounter: [id number]    Discharging Physician / Practitioner: Giuliana Romo MD  PCP: No primary care provider on file  Admission Date: 12/26/2017  Discharge Date: 12/26/17    Reason for Admission: chest pain    Discharge Diagnoses:     Principal Problem:    Chest pain  Active Problems:    Asthma    Tobacco abuse  Resolved Problems:    * No resolved hospital problems  *      Consultations During Hospital Stay:  · Cardiology    Procedures Performed:     · Exercise stress test:  -  Stress results: Duration of exercise was 11 min  Target heart rate was achieved  There was no chest pain during stress  -  ECG conclusions: The stress ECG was negative for ischemia  Significant Findings / Test Results:     · Cholesterol total 180, triglycerides 228, HDL 34,     Incidental Findings:   · None     Test Results Pending at Discharge (will require follow up): · None     Outpatient Tests Requested:  · 24 hour Holter monitor    Complications:  None    Hospital Course:     Crow Jorge is a 52 y o  male patient who originally presented to the hospital on 12/26/2017 due to acute left-sided chest pain  Cardiac workup was initiated including telemetry monitoring and serial troponin  The workup was negative acute coronary syndrome  Given risk factors that include patient's age and sex, tobacco use and low HDL, patient underwent an exercise stress test which was negative for ischemia  Given an atypical presentation of patient's chest pain possibility gastric reflux cannot be excluded, therefore, the patient was discharged on an empiric treatment of 2 week course Protonix    Given proximity of patient's age to 48 he was also recommended to continue aspirin which he had started prior to admission to the hospital   To completely rule out the possibility of a arrhythmia, the patient was also instructed to have 24 hour Holter monitor with results sent to a cardiologist Dr Jazmin Bunch for interpretation  If now events are recorded, the patient does not need to follow up with Cardiology  He will be contacted by Cardiology regarding this  At the time of the discharge the patient had normal and stable vital signs, normal oxygen saturation on room air, and good oral intake  He was instructed to establish care with primary care provider as soon as possible  He verbalized understanding  His prescriptions were electronically sent to his pharmacy  Condition at Discharge: good     Discharge Day Visit / Exam:     Subjective:  Patient seen and examined  He is reporting still time residual left-sided dull pain and the left side of the chest   He denies heartburn or history GERD  He denies cough or shortness of breath  He denies lightheadedness  He denies abdominal pain, nausea, vomiting, diarrhea or constipation  He reports occasional palpitations not currently  Vitals: Blood Pressure: 110/73 (12/26/17 1429)  Pulse: 71 (12/26/17 1429)  Temperature: (!) 97 1 °F (36 2 °C) (12/26/17 1429)  Temp Source: Tympanic (12/26/17 1429)  Respirations: 18 (12/26/17 1429)  Height: 6' (182 9 cm) (12/26/17 0416)  Weight - Scale: 88 kg (194 lb 0 1 oz) (12/26/17 0416)  SpO2: 97 % (12/26/17 1429)  Exam:     Physical Exam   Constitutional: He is oriented to person, place, and time  He appears well-developed and well-nourished  HENT:   Head: Normocephalic  Eyes: Conjunctivae are normal  Pupils are equal, round, and reactive to light  Neck: Normal range of motion  Neck supple  No JVD present  Cardiovascular: Normal rate and regular rhythm  Exam reveals no gallop and no friction rub  No murmur heard  Pulmonary/Chest: Effort normal  No respiratory distress  He has no wheezes  He has no rales  Abdominal: Soft  He exhibits no distension  There is no tenderness  There is no guarding  Musculoskeletal: He exhibits no edema  Neurological: He is alert and oriented to person, place, and time  Skin: Skin is warm and dry  Discussion with Family: Patient only    Discharge instructions/Information to patient and family:   See after visit summary for information provided to patient and family  Provisions for Follow-Up Care:  See after visit summary for information related to follow-up care and any pertinent home health orders  Disposition:     Home    For Discharges to [facility] SNF:   · Not Applicable to this Patient - Not Applicable to this Patient    Planned Readmission: No     Discharge Statement:  I spent 45 minutes discharging the patient  This time was spent on the day of discharge  I had direct contact with the patient on the day of discharge  Greater than 50% of the total time was spent examining patient, answering all patient questions, arranging and discussing plan of care with patient as well as directly providing post-discharge instructions  Additional time then spent on discharge activities  Discharge Medications:  See after visit summary for reconciled discharge medications provided to patient and family        ** Please Note: This note has been constructed using a voice recognition system **

## 2017-12-26 NOTE — PLAN OF CARE
Problem: PAIN - ADULT  Goal: Verbalizes/displays adequate comfort level or baseline comfort level  Interventions:  - Encourage patient to monitor pain and request assistance  - Assess pain using appropriate pain scale  - Administer analgesics based on type and severity of pain and evaluate response  - Implement non-pharmacological measures as appropriate and evaluate response  - Consider cultural and social influences on pain and pain management  - Notify physician/advanced practitioner if interventions unsuccessful or patient reports new pain  Outcome: Progressing      Problem: INFECTION - ADULT  Goal: Absence or prevention of progression during hospitalization  INTERVENTIONS:  - Assess and monitor for signs and symptoms of infection  - Monitor lab/diagnostic results  - Monitor all insertion sites, i e  indwelling lines, tubes, and drains  - Monitor endotracheal (as able) and nasal secretions for changes in amount and color  - [place] appropriate cooling/warming therapies per order  - Administer medications as ordered  - Instruct and encourage patient and family to use good hand hygiene technique  - Identify and instruct in appropriate isolation precautions for identified infection/condition  Outcome: Progressing    Goal: Absence of fever/infection during neutropenic period  INTERVENTIONS:  - Monitor WBC  - Implement neutropenic guidelines  Outcome: Progressing      Problem: SAFETY ADULT  Goal: Patient will remain free of falls  INTERVENTIONS:  - Assess patient frequently for physical needs  -  Identify cognitive and physical deficits and behaviors that affect risk of falls    -  [place] fall precautions as indicated by assessment   - Educate patient/family on patient safety including physical limitations  - Instruct patient to call for assistance with activity based on assessment  - Modify environment to reduce risk of injury  - Consider OT/PT consult to assist with strengthening/mobility  Outcome: Progressing    Goal: Maintain or return to baseline ADL function  INTERVENTIONS:  -  Assess patient's ability to carry out ADLs; assess patient's baseline for ADL function and identify physical deficits which impact ability to perform ADLs (bathing, care of mouth/teeth, toileting, grooming, dressing, etc )  - Assess/evaluate cause of self-care deficits   - Assess range of motion  - Assess patient's mobility; develop plan if impaired  - Assess patient's need for assistive devices and provide as appropriate  - Encourage maximum independence but intervene and supervise when necessary  ¯ Involve family in performance of ADLs  ¯ Assess for home care needs following discharge   ¯ Request OT consult to assist with ADL evaluation and planning for discharge  ¯ Provide patient education as appropriate  Outcome: Progressing    Goal: Maintain or return mobility status to optimal level  INTERVENTIONS:  - Assess patient's baseline mobility status (ambulation, transfers, stairs, etc )    - Identify cognitive and physical deficits and behaviors that affect mobility  - Identify mobility aids required to assist with transfers and/or ambulation (gait belt, sit-to-stand, lift, walker, cane, etc )  - [place] fall precautions as indicated by assessment  - Record patient progress and toleration of activity level on Mobility SBAR; progress patient to next Phase/Stage  - Instruct patient to call for assistance with activity based on assessment  - Request Rehabilitation consult to assist with strengthening/weightbearing, etc   Outcome: Progressing      Problem: DISCHARGE PLANNING  Goal: Discharge to home or other facility with appropriate resources  INTERVENTIONS:  - Identify barriers to discharge w/patient and caregiver  - Arrange for needed discharge resources and transportation as appropriate  - Identify discharge learning needs (meds, wound care, etc )  - Arrange for interpretive services to assist at discharge as needed  - Refer to Case Management Department for coordinating discharge planning if the patient needs post-hospital services based on physician/advanced practitioner order or complex needs related to functional status, cognitive ability, or social support system  Outcome: Progressing      Problem: Knowledge Deficit  Goal: Patient/family/caregiver demonstrates understanding of disease process, treatment plan, medications, and discharge instructions  Complete learning assessment and assess knowledge base  Interventions:  - Provide teaching at level of understanding  - Provide teaching via preferred learning methods  Outcome: Progressing      Problem: CARDIOVASCULAR - ADULT  Goal: Maintains optimal cardiac output and hemodynamic stability  INTERVENTIONS:  - Monitor I/O, vital signs and rhythm  - Monitor for S/S and trends of decreased cardiac output i e  bleeding, hypotension  - Administer and titrate ordered vasoactive medications to optimize hemodynamic stability  - Assess quality of pulses, skin color and temperature  - Assess for signs of decreased coronary artery perfusion - ex   Angina  - Instruct patient to report change in severity of symptoms  Outcome: Progressing    Goal: Absence of cardiac dysrhythmias or at baseline rhythm  INTERVENTIONS:  - Continuous cardiac monitoring, monitor vital signs, obtain 12 lead EKG if indicated  - Administer antiarrhythmic and heart rate control medications as ordered  - Monitor electrolytes and administer replacement therapy as ordered  Outcome: Progressing

## 2018-03-24 LAB
ABSOL LYMPHOCYTES (HISTORICAL): 2.6 K/UL (ref 0.5–4)
ALBUMIN SERPL BCP-MCNC: 4.3 G/DL (ref 3–5.2)
ALP SERPL-CCNC: 61 U/L (ref 43–122)
ALT SERPL W P-5'-P-CCNC: 30 U/L (ref 9–52)
ANION GAP SERPL CALCULATED.3IONS-SCNC: 12 MMOL/L (ref 5–14)
AST SERPL W P-5'-P-CCNC: 29 U/L (ref 17–59)
BASOPHILS # BLD AUTO: 0.1 K/UL (ref 0–0.1)
BASOPHILS # BLD AUTO: 1 % (ref 0–1)
BILIRUB SERPL-MCNC: 0.5 MG/DL
BUN SERPL-MCNC: 15 MG/DL (ref 5–25)
CALCIUM SERPL-MCNC: 9.7 MG/DL (ref 8.4–10.2)
CHLORIDE SERPL-SCNC: 106 MEQ/L (ref 97–108)
CHOLEST SERPL-MCNC: 197 MG/DL
CHOLEST/HDLC SERPL: 5.1 {RATIO}
CO2 SERPL-SCNC: 25 MMOL/L (ref 22–30)
CREATINE, SERUM (HISTORICAL): 1 MG/DL (ref 0.7–1.5)
DEPRECATED RDW RBC AUTO: 13.3 %
EGFR (HISTORICAL): >60 ML/MIN/1.73 M2
EOSINOPHIL # BLD AUTO: 0.2 K/UL (ref 0–0.4)
EOSINOPHIL NFR BLD AUTO: 3 % (ref 0–6)
GLUCOSE FASTING (HISTORICAL): 96 MG/DL (ref 70–99)
HCT VFR BLD AUTO: 43.6 % (ref 41–53)
HDLC SERPL-MCNC: 39 MG/DL
HGB BLD-MCNC: 14.6 G/DL (ref 13.5–17.5)
HIV 1/2 AB DIFFERENTIATION (HISTORICAL): NEGATIVE
LDL/HDL RATIO (HISTORICAL): 3.4
LDLC SERPL CALC-MCNC: 133 MG/DL
LYMPHOCYTES NFR BLD AUTO: 34 % (ref 25–45)
MCH RBC QN AUTO: 30.3 PG (ref 26–34)
MCHC RBC AUTO-ENTMCNC: 33.5 % (ref 31–36)
MCV RBC AUTO: 90 FL (ref 80–100)
MONOCYTES # BLD AUTO: 0.5 K/UL (ref 0.2–0.9)
MONOCYTES NFR BLD AUTO: 7 % (ref 1–10)
NEUTROPHILS ABS COUNT (HISTORICAL): 4.3 K/UL (ref 1.8–7.8)
NEUTS SEG NFR BLD AUTO: 55 % (ref 45–65)
PLATELET # BLD AUTO: 276 K/MCL (ref 150–450)
POTASSIUM SERPL-SCNC: 4.2 MEQ/L (ref 3.6–5)
RBC # BLD AUTO: 4.82 M/MCL (ref 4.5–5.9)
SODIUM SERPL-SCNC: 143 MEQ/L (ref 137–147)
TOTAL PROTEIN (HISTORICAL): 7.3 G/DL (ref 5.9–8.4)
TRIGL SERPL-MCNC: 125 MG/DL
VLDLC SERPL CALC-MCNC: 25 MG/DL (ref 0–40)
WBC # BLD AUTO: 7.8 K/MCL (ref 4.5–11)

## 2018-04-04 LAB
COMMENT (HISTORICAL): NORMAL
INFLUENZA A (VIRAL ID) (HISTORICAL): ABNORMAL
INFLUENZA B (VIRAL ID) (HISTORICAL): DETECTED

## 2018-04-08 LAB — C DIFFICILE TOXIN GENE NAA (HISTORICAL): NORMAL

## 2018-04-16 LAB
ARRHY DURING EX: NORMAL
CHEST PAIN STATEMENT: NORMAL
MAX DIASTOLIC BP: 57 MMHG
MAX HEART RATE: 160 BPM
MAX PREDICTED HEART RATE: 173 BPM
MAX. SYSTOLIC BP: 150 MMHG
PROTOCOL NAME: NORMAL
TARGET HR FORMULA: NORMAL
TEST INDICATION: NORMAL
TIME IN EXERCISE PHASE: NORMAL

## 2019-01-28 ENCOUNTER — TELEPHONE (OUTPATIENT)
Dept: FAMILY MEDICINE CLINIC | Facility: CLINIC | Age: 49
End: 2019-01-28

## 2019-01-28 ENCOUNTER — OFFICE VISIT (OUTPATIENT)
Dept: FAMILY MEDICINE CLINIC | Facility: CLINIC | Age: 49
End: 2019-01-28

## 2019-01-28 VITALS
HEIGHT: 72 IN | SYSTOLIC BLOOD PRESSURE: 160 MMHG | TEMPERATURE: 97.2 F | HEART RATE: 98 BPM | DIASTOLIC BLOOD PRESSURE: 88 MMHG | WEIGHT: 193 LBS | BODY MASS INDEX: 26.14 KG/M2 | RESPIRATION RATE: 18 BRPM | OXYGEN SATURATION: 98 %

## 2019-01-28 DIAGNOSIS — H10.9 BACTERIAL CONJUNCTIVITIS: ICD-10-CM

## 2019-01-28 DIAGNOSIS — B00.9 HERPES: Primary | ICD-10-CM

## 2019-01-28 DIAGNOSIS — R05.9 COUGH: ICD-10-CM

## 2019-01-28 PROBLEM — H11.001 PTERYGIUM EYE, RIGHT: Status: ACTIVE | Noted: 2019-01-28

## 2019-01-28 PROBLEM — J06.9 URI (UPPER RESPIRATORY INFECTION): Status: ACTIVE | Noted: 2019-01-28

## 2019-01-28 PROBLEM — B96.89 BACTERIAL CONJUNCTIVITIS OF BOTH EYES: Status: ACTIVE | Noted: 2019-01-28

## 2019-01-28 PROCEDURE — 99213 OFFICE O/P EST LOW 20 MIN: CPT | Performed by: FAMILY MEDICINE

## 2019-01-28 RX ORDER — VALACYCLOVIR HYDROCHLORIDE 500 MG/1
500 TABLET, FILM COATED ORAL DAILY
Qty: 30 TABLET | Refills: 2 | Status: SHIPPED | OUTPATIENT
Start: 2019-01-28 | End: 2019-04-05 | Stop reason: SDUPTHER

## 2019-01-28 RX ORDER — VALACYCLOVIR HYDROCHLORIDE 500 MG/1
TABLET, FILM COATED ORAL
COMMUNITY
Start: 2018-03-26 | End: 2019-01-28 | Stop reason: SDUPTHER

## 2019-01-28 RX ORDER — POLYMYXIN B SULFATE AND TRIMETHOPRIM 1; 10000 MG/ML; [USP'U]/ML
1 SOLUTION OPHTHALMIC EVERY 6 HOURS
Qty: 10 ML | Refills: 0 | Status: SHIPPED | OUTPATIENT
Start: 2019-01-28 | End: 2019-04-05

## 2019-01-28 NOTE — PROGRESS NOTES
Assessment/Plan:    URI (upper respiratory infection)  Discussed that symptoms are likely cause by virus  Discussed importance of increasing fluid intake and getting plenty of rest   Discussed supportive care with use of OTC cough drops and OTC cough syrup  Symptoms may improve with home humidifier use or exposure to steam from hot shower  Sinus irrigation and warm saltwater gargles may also provide relief  Educated on hand hygiene to prevent spread  Minimize prolonged close contact others while ill  Continue to monitor symptoms closely and call the office if symptoms worsen or do not improve  Will give Mucinex twice a day for 10 days      Bacterial conjunctivitis of both eyes  His allergic to Doxycyline  Will give Polytrim eyedrops for 10 days  Encouraged washing hands frequently as condition is very contagious    Herpes  Has more than 4 flares in a year  Requesting to go on suppressive medication  Will give Valtrex 500 milligram daily    Pterygium eye, right  Encouraged to see ophthalmologist as patient states lesion is increasing in size  I can see that it has extended over the right pupil at this time       Diagnoses and all orders for this visit:    Herpes  -     valACYclovir (VALTREX) 500 mg tablet; Take 1 tablet (500 mg total) by mouth daily for 360 days    Bacterial conjunctivitis  -     polymyxin b-trimethoprim (POLYTRIM) ophthalmic solution; Administer 1 drop to both eyes every 6 (six) hours    Cough  -     dextromethorphan-guaifenesin (MUCINEX DM)  MG per 12 hr tablet; Take 1 tablet by mouth every 12 (twelve) hours    Other orders  -     Discontinue: valACYclovir (VALTREX) 500 mg tablet; take 1 tablet by oral route 2 times every day for 3 days within 24 hours of onset          Subjective:      Patient ID: Jesús Berger is a 50 y o  male  HPI  Patient is here due to cough, congestion and sore throat started on Thursday  He feels that he has sinuses are full of mucus    And he tried taking NyQuil without much improvement  Denies any chest pain or fever however reports occasional chills  He is also complaining of bilateral eye discharge when he wakes up in the morning  He noticed thick green is discharged on eyelids bilaterally    The following portions of the patient's history were reviewed and updated as appropriate: allergies, current medications, past family history, past medical history, past social history, past surgical history and problem list     Review of Systems   Constitutional: Positive for chills  Negative for fatigue and fever  HENT: Positive for congestion, rhinorrhea and sore throat  Negative for ear discharge and sneezing  Eyes: Positive for discharge  Negative for pain and visual disturbance  Respiratory: Positive for cough  Negative for chest tightness and shortness of breath  Cardiovascular: Negative for chest pain and palpitations  Gastrointestinal: Negative for abdominal distention, abdominal pain, blood in stool, diarrhea, nausea and vomiting  Genitourinary: Negative for difficulty urinating, dysuria and flank pain  Musculoskeletal: Negative for arthralgias and joint swelling  Skin: Negative for pallor and rash  Neurological: Positive for headaches  Negative for dizziness and syncope  Hematological: Negative for adenopathy  Psychiatric/Behavioral: Negative for agitation and confusion  Objective:      /88 (BP Location: Right arm, Patient Position: Sitting, Cuff Size: Standard)   Pulse 98   Temp (!) 97 2 °F (36 2 °C) (Temporal)   Resp 18   Ht 6' (1 829 m)   Wt 87 5 kg (193 lb)   SpO2 98%   BMI 26 18 kg/m²          Physical Exam   Constitutional: He is oriented to person, place, and time  He appears well-developed and well-nourished  HENT:   Head: Normocephalic and atraumatic  Mouth/Throat: Oropharynx is clear and moist    Eyes: Pupils are equal, round, and reactive to light  Right conjunctiva is injected   Left conjunctiva is injected  Right pupil is reactive  Left pupil is reactive  Flesh like lesion extended from the medial aspect of right eye over conjunctivae   Neck: No tracheal deviation present  No thyromegaly present  Cardiovascular: Regular rhythm and normal heart sounds  Exam reveals no friction rub  No murmur heard  Pulmonary/Chest: No respiratory distress  He has no wheezes  He has no rales  He exhibits no tenderness  Abdominal: Soft  Bowel sounds are normal  He exhibits no distension  There is no tenderness  Musculoskeletal: Normal range of motion  He exhibits no deformity  Neurological: He is alert and oriented to person, place, and time  Skin: Skin is warm and dry  Rash noted  Rash is papular  No erythema  Vitals reviewed

## 2019-01-28 NOTE — TELEPHONE ENCOUNTER
Pt called stating pharmacy has not received medications from today's [facility]

## 2019-01-28 NOTE — TELEPHONE ENCOUNTER
All meds were sent  Pharmacy has to check again  I sent them to [facility]   Not sure why they didn't receive it

## 2019-01-28 NOTE — ASSESSMENT & PLAN NOTE
Encouraged to see ophthalmologist as patient states lesion is increasing in size    I can see that it has extended over the right pupil at this time

## 2019-01-28 NOTE — ASSESSMENT & PLAN NOTE
His allergic to Doxycyline  Will give Polytrim eyedrops for 10 days    Encouraged washing hands frequently as condition is very contagious

## 2019-01-28 NOTE — ASSESSMENT & PLAN NOTE
Discussed that symptoms are likely cause by virus  Discussed importance of increasing fluid intake and getting plenty of rest   Discussed supportive care with use of OTC cough drops and OTC cough syrup  Symptoms may improve with home humidifier use or exposure to steam from hot shower  Sinus irrigation and warm saltwater gargles may also provide relief  Educated on hand hygiene to prevent spread  Minimize prolonged close contact others while ill  Continue to monitor symptoms closely and call the office if symptoms worsen or do not improve    Will give Mucinex twice a day for 10 days

## 2019-01-28 NOTE — ASSESSMENT & PLAN NOTE
Has more than 4 flares in a year  Requesting to go on suppressive medication    Will give Valtrex 500 milligram daily

## 2019-02-01 ENCOUNTER — OFFICE VISIT (OUTPATIENT)
Dept: FAMILY MEDICINE CLINIC | Facility: CLINIC | Age: 49
End: 2019-02-01

## 2019-02-01 VITALS
TEMPERATURE: 98.2 F | HEART RATE: 93 BPM | WEIGHT: 196 LBS | BODY MASS INDEX: 26.55 KG/M2 | SYSTOLIC BLOOD PRESSURE: 158 MMHG | RESPIRATION RATE: 18 BRPM | HEIGHT: 72 IN | DIASTOLIC BLOOD PRESSURE: 84 MMHG | OXYGEN SATURATION: 98 %

## 2019-02-01 DIAGNOSIS — H92.01 RIGHT EAR PAIN: ICD-10-CM

## 2019-02-01 DIAGNOSIS — J40 BRONCHITIS: Primary | ICD-10-CM

## 2019-02-01 PROCEDURE — 99213 OFFICE O/P EST LOW 20 MIN: CPT | Performed by: FAMILY MEDICINE

## 2019-02-01 RX ORDER — AZITHROMYCIN 250 MG/1
TABLET, FILM COATED ORAL
Qty: 6 TABLET | Refills: 0 | Status: SHIPPED | OUTPATIENT
Start: 2019-02-01 | End: 2019-02-05

## 2019-02-01 NOTE — PROGRESS NOTES
Assessment/Plan:      Diagnoses and all orders for this visit:    Bronchitis  -     azithromycin (ZITHROMAX) 250 mg tablet; Take 2 tablets today then 1 tablet daily x 4 days  - given course of illness with worsening in his symptoms over 8 days, in particular his cough, will give patient azithromycin to see if he has any improvement  - educated that the course of viral illness may be up to 14 days and that he may have improved over the next few days regardless of antibiotic, but considering this is his second visit with worsening symptoms, will treat  - strongly advised smoking cessation and counseled that, if this in fact a bronchitis, he may have continues coughing for several months regardless of the course of antibiotics  - advised to contact out office in the next 5 to 7 days if no improvement to be evaluated    Right ear pain  - no significant findings, likely referred from the throat pain          Subjective:     Patient ID: George Agarwal is a 50 y o  male  49 yo M who comes into the clinic today with no improvement in his symptoms of sore throat, cough, and R ear pain  He denies any fever  He reports the ear pain is new since his previous visit, but denies any discharge or bleeding  He continues to smoke cigarettes despite his symptoms  Review of Systems   Constitutional: Negative for chills, fatigue and fever  HENT: Positive for congestion and sore throat  Respiratory: Positive for cough  Negative for chest tightness, shortness of breath and wheezing  Cardiovascular: Negative for chest pain and palpitations  Objective:     Physical Exam   Constitutional: He appears well-developed and well-nourished  HENT:   Head: Normocephalic and atraumatic     Right Ear: Hearing, tympanic membrane, external ear and ear canal normal    Left Ear: Hearing, tympanic membrane, external ear and ear canal normal    Mouth/Throat: Uvula is midline and mucous membranes are normal  Posterior oropharyngeal edema present  No oropharyngeal exudate  Neck: Normal range of motion  Cardiovascular: Normal rate and normal heart sounds  No murmur heard  Pulmonary/Chest: Effort normal and breath sounds normal  He has no wheezes  Lymphadenopathy:     He has cervical adenopathy  Nursing note and vitals reviewed

## 2019-04-05 ENCOUNTER — OFFICE VISIT (OUTPATIENT)
Dept: FAMILY MEDICINE CLINIC | Facility: CLINIC | Age: 49
End: 2019-04-05

## 2019-04-05 VITALS
TEMPERATURE: 97.4 F | BODY MASS INDEX: 27.5 KG/M2 | WEIGHT: 203 LBS | DIASTOLIC BLOOD PRESSURE: 90 MMHG | HEIGHT: 72 IN | SYSTOLIC BLOOD PRESSURE: 132 MMHG | OXYGEN SATURATION: 99 % | RESPIRATION RATE: 16 BRPM | HEART RATE: 76 BPM

## 2019-04-05 DIAGNOSIS — L73.2 HIDRADENITIS AXILLARIS: ICD-10-CM

## 2019-04-05 DIAGNOSIS — E78.2 MIXED HYPERLIPIDEMIA: ICD-10-CM

## 2019-04-05 DIAGNOSIS — A60.01 HERPES SIMPLEX INFECTION OF PENIS: ICD-10-CM

## 2019-04-05 DIAGNOSIS — Z23 NEED FOR PNEUMOCOCCAL VACCINATION: Primary | ICD-10-CM

## 2019-04-05 DIAGNOSIS — Z72.0 TOBACCO ABUSE: ICD-10-CM

## 2019-04-05 DIAGNOSIS — B00.9 HERPES: ICD-10-CM

## 2019-04-05 DIAGNOSIS — L30.9 ECZEMA OF LOWER EXTREMITY: ICD-10-CM

## 2019-04-05 PROBLEM — H10.9 BACTERIAL CONJUNCTIVITIS OF BOTH EYES: Status: RESOLVED | Noted: 2019-01-28 | Resolved: 2019-04-05

## 2019-04-05 PROBLEM — B96.89 BACTERIAL CONJUNCTIVITIS OF BOTH EYES: Status: RESOLVED | Noted: 2019-01-28 | Resolved: 2019-04-05

## 2019-04-05 PROBLEM — H11.001 PTERYGIUM EYE, RIGHT: Status: RESOLVED | Noted: 2019-01-28 | Resolved: 2019-04-05

## 2019-04-05 PROBLEM — R07.9 CHEST PAIN: Status: RESOLVED | Noted: 2017-12-26 | Resolved: 2019-04-05

## 2019-04-05 PROBLEM — A60.00 GENITAL HERPES: Status: ACTIVE | Noted: 2018-03-24

## 2019-04-05 PROBLEM — J06.9 URI (UPPER RESPIRATORY INFECTION): Status: RESOLVED | Noted: 2019-01-28 | Resolved: 2019-04-05

## 2019-04-05 PROBLEM — J45.909 ASTHMA: Status: RESOLVED | Noted: 2017-12-26 | Resolved: 2019-04-05

## 2019-04-05 PROCEDURE — 99213 OFFICE O/P EST LOW 20 MIN: CPT | Performed by: FAMILY MEDICINE

## 2019-04-05 RX ORDER — TRIAMCINOLONE ACETONIDE 1 MG/G
CREAM TOPICAL 2 TIMES DAILY
Qty: 30 G | Refills: 0 | Status: SHIPPED | OUTPATIENT
Start: 2019-04-05 | End: 2019-05-14 | Stop reason: SDUPTHER

## 2019-04-05 RX ORDER — VALACYCLOVIR HYDROCHLORIDE 500 MG/1
500 TABLET, FILM COATED ORAL DAILY
Qty: 90 TABLET | Refills: 0 | Status: SHIPPED | OUTPATIENT
Start: 2019-04-05 | End: 2019-05-14 | Stop reason: SDUPTHER

## 2019-05-14 ENCOUNTER — TELEPHONE (OUTPATIENT)
Dept: FAMILY MEDICINE CLINIC | Facility: CLINIC | Age: 49
End: 2019-05-14

## 2019-05-14 DIAGNOSIS — Z72.0 TOBACCO ABUSE: ICD-10-CM

## 2019-05-14 DIAGNOSIS — B00.9 HERPES: ICD-10-CM

## 2019-05-14 DIAGNOSIS — E78.2 MIXED HYPERLIPIDEMIA: ICD-10-CM

## 2019-05-14 DIAGNOSIS — L30.9 ECZEMA OF LOWER EXTREMITY: ICD-10-CM

## 2019-05-14 RX ORDER — TRIAMCINOLONE ACETONIDE 1 MG/G
CREAM TOPICAL 2 TIMES DAILY
Qty: 30 G | Refills: 0 | Status: SHIPPED | OUTPATIENT
Start: 2019-05-14

## 2019-05-14 RX ORDER — VALACYCLOVIR HYDROCHLORIDE 500 MG/1
500 TABLET, FILM COATED ORAL DAILY
Qty: 90 TABLET | Refills: 0 | Status: SHIPPED | OUTPATIENT
Start: 2019-05-14 | End: 2020-06-05 | Stop reason: SDUPTHER

## 2019-09-23 ENCOUNTER — APPOINTMENT (OUTPATIENT)
Dept: LAB | Facility: CLINIC | Age: 49
End: 2019-09-23
Payer: COMMERCIAL

## 2019-09-23 ENCOUNTER — OFFICE VISIT (OUTPATIENT)
Dept: FAMILY MEDICINE CLINIC | Facility: CLINIC | Age: 49
End: 2019-09-23

## 2019-09-23 VITALS
DIASTOLIC BLOOD PRESSURE: 80 MMHG | HEIGHT: 72 IN | OXYGEN SATURATION: 99 % | TEMPERATURE: 97.6 F | BODY MASS INDEX: 26.56 KG/M2 | SYSTOLIC BLOOD PRESSURE: 130 MMHG | WEIGHT: 196.1 LBS | HEART RATE: 69 BPM | RESPIRATION RATE: 16 BRPM

## 2019-09-23 DIAGNOSIS — R94.4 ABNORMAL RENAL FUNCTION TEST: Primary | ICD-10-CM

## 2019-09-23 DIAGNOSIS — Z72.0 TOBACCO ABUSE: ICD-10-CM

## 2019-09-23 DIAGNOSIS — I83.92 ASYMPTOMATIC VARICOSE VEINS OF LEFT LOWER EXTREMITY: ICD-10-CM

## 2019-09-23 DIAGNOSIS — R94.4 ABNORMAL RENAL FUNCTION TEST: ICD-10-CM

## 2019-09-23 LAB
ANION GAP SERPL CALCULATED.3IONS-SCNC: 6 MMOL/L (ref 4–13)
BACTERIA UR QL AUTO: NORMAL /HPF
BILIRUB UR QL STRIP: NEGATIVE
BUN SERPL-MCNC: 13 MG/DL (ref 5–25)
CALCIUM SERPL-MCNC: 10.1 MG/DL (ref 8.3–10.1)
CHLORIDE SERPL-SCNC: 107 MMOL/L (ref 100–108)
CLARITY UR: CLEAR
CO2 SERPL-SCNC: 28 MMOL/L (ref 21–32)
COLOR UR: YELLOW
CREAT SERPL-MCNC: 0.93 MG/DL (ref 0.6–1.3)
GFR SERPL CREATININE-BSD FRML MDRD: 96 ML/MIN/1.73SQ M
GLUCOSE SERPL-MCNC: 107 MG/DL (ref 65–140)
GLUCOSE UR STRIP-MCNC: NEGATIVE MG/DL
HGB UR QL STRIP.AUTO: NEGATIVE
HYALINE CASTS #/AREA URNS LPF: NORMAL /LPF
KETONES UR STRIP-MCNC: NEGATIVE MG/DL
LEUKOCYTE ESTERASE UR QL STRIP: NEGATIVE
NITRITE UR QL STRIP: NEGATIVE
NON-SQ EPI CELLS URNS QL MICRO: NORMAL /HPF
PH UR STRIP.AUTO: 6 [PH]
POTASSIUM SERPL-SCNC: 4 MMOL/L (ref 3.5–5.3)
PROT UR STRIP-MCNC: ABNORMAL MG/DL
RBC #/AREA URNS AUTO: NORMAL /HPF
SODIUM SERPL-SCNC: 141 MMOL/L (ref 136–145)
SP GR UR STRIP.AUTO: 1.01 (ref 1–1.03)
UROBILINOGEN UR QL STRIP.AUTO: 0.2 E.U./DL
WBC #/AREA URNS AUTO: NORMAL /HPF

## 2019-09-23 PROCEDURE — 81001 URINALYSIS AUTO W/SCOPE: CPT | Performed by: FAMILY MEDICINE

## 2019-09-23 PROCEDURE — 3008F BODY MASS INDEX DOCD: CPT | Performed by: FAMILY MEDICINE

## 2019-09-23 PROCEDURE — 36415 COLL VENOUS BLD VENIPUNCTURE: CPT

## 2019-09-23 PROCEDURE — 4004F PT TOBACCO SCREEN RCVD TLK: CPT | Performed by: FAMILY MEDICINE

## 2019-09-23 PROCEDURE — 99213 OFFICE O/P EST LOW 20 MIN: CPT | Performed by: FAMILY MEDICINE

## 2019-09-23 PROCEDURE — 80048 BASIC METABOLIC PNL TOTAL CA: CPT

## 2019-09-23 NOTE — ASSESSMENT & PLAN NOTE
- Compression stockings  - Limb elevation  - Avoid prolonged standing/ sitting   Take breaks at work about every 1 - 2 hours to walk for 5 minutes

## 2019-09-23 NOTE — ASSESSMENT & PLAN NOTE
- Baseline creat around 1 0 with 1 57 on 9/ 20  - No LUTS  - Stay well hydrated  - Avoid NSAIDs   - Check UA and BMP

## 2019-09-23 NOTE — PROGRESS NOTES
Assessment/Plan:    Abnormal renal function test  - Baseline creat around 1 0 with 1 57 on 9/ 20  - No LUTS  - Stay well hydrated  - Avoid NSAIDs   - Check UA and BMP    Asymptomatic varicose veins of left lower extremity  - Compression stockings  - Limb elevation  - Avoid prolonged standing/ sitting  Take breaks at work about every 1 - 2 hours to walk for 5 minutes     Tobacco abuse  - Counseled on cessation  - Pre contemplation stage   - Reassess at f/u and NRT        Diagnoses and all orders for this visit:    Abnormal renal function test  -     Basic metabolic panel; Future  -     UA (URINE) with reflex to Microscopic    Asymptomatic varicose veins of left lower extremity    Tobacco abuse    Other orders  -     mupirocin (BACTROBAN) 2 % ointment; Apply topically 3 (three) times a day          Subjective:      Patient ID: Jeannette Love is a 52 y o  male  52 y o male presents as ER f/u from 9/20/2019  Was seen at [facility] for C/O B/L LE swelling, L >R   VTE was ruled out  BNP was normal  LFTs normal  Was noted to have Creat of 1 57  Baseline close to 1 0  Was advised to f/u with PCP  C/O leg swelling x 2 months  L> R  More noticeable at end of day and improves after sleeping/ LE elevation  Patient works as  and "stands on his feet close to 10 hours a day"  Not a known diabetic/ hypertensive  C/O urinary frothing  Denies any dysuria/ flank pain/ fever/ chills/ difficulty initiating urination/ slow stream/ incomplete void  No chronic NSAID use  No recent Abx use  H/O ESRD in [place] and aunt - both were diabetic  The following portions of the patient's history were reviewed and updated as appropriate: He  has a past medical history of Asthma    Patient Active Problem List    Diagnosis Date Noted    Abnormal renal function test 09/23/2019    Asymptomatic varicose veins of left lower extremity 09/23/2019    Mixed hyperlipidemia 04/05/2019    Eczema of lower extremity 04/05/2019    Hidradenitis axillaris 04/05/2019    Genital herpes 03/24/2018    Tobacco abuse 12/26/2017     He  has no past surgical history on file  His family history is not on file  He  reports that he has been smoking  He has been smoking about 1 00 pack per day  He has never used smokeless tobacco  He reports that he drinks alcohol  He reports that he has current or past drug history  Drugs: "Crack" cocaine and Heroin  Current Outpatient Medications   Medication Sig Dispense Refill    mupirocin (BACTROBAN) 2 % ointment Apply topically 3 (three) times a day      triamcinolone (KENALOG) 0 1 % cream Apply topically 2 (two) times a day (Patient not taking: Reported on 9/23/2019) 30 g 0    valACYclovir (VALTREX) 500 mg tablet Take 1 tablet (500 mg total) by mouth daily for 360 days (Patient not taking: Reported on 9/23/2019) 90 tablet 0     No current facility-administered medications for this visit  Current Outpatient Medications on File Prior to Visit   Medication Sig    mupirocin (BACTROBAN) 2 % ointment Apply topically 3 (three) times a day    triamcinolone (KENALOG) 0 1 % cream Apply topically 2 (two) times a day (Patient not taking: Reported on 9/23/2019)    valACYclovir (VALTREX) 500 mg tablet Take 1 tablet (500 mg total) by mouth daily for 360 days (Patient not taking: Reported on 9/23/2019)     No current facility-administered medications on file prior to visit  He is allergic to doxycycline       Review of Systems   Constitutional: Negative for chills and fever  HENT: Negative for congestion, ear discharge, ear pain, sore throat and trouble swallowing  Eyes: Negative for visual disturbance  Respiratory: Negative for cough, shortness of breath and wheezing  Cardiovascular: Positive for leg swelling  Negative for chest pain and palpitations  Gastrointestinal: Negative for abdominal pain, blood in stool, constipation, diarrhea, nausea and vomiting     Genitourinary: Negative for decreased urine volume, difficulty urinating, dysuria, enuresis, flank pain, frequency and hematuria  Musculoskeletal: Negative for back pain and myalgias  Skin: Negative for rash  Neurological: Negative for weakness and headaches  Hematological: Negative for adenopathy  Objective:      /80 (BP Location: Left arm, Patient Position: Sitting)   Pulse 69   Temp 97 6 °F (36 4 °C) (Tympanic)   Resp 16   Ht 6' (1 829 m)   Wt 89 kg (196 lb 1 6 oz)   SpO2 99%   BMI 26 60 kg/m²          Physical Exam   Constitutional: He is oriented to person, place, and time  He appears well-developed and well-nourished  No distress  HENT:   Head: Normocephalic and atraumatic  Right Ear: External ear normal    Left Ear: External ear normal    Mouth/Throat: No oropharyngeal exudate  Eyes: Pupils are equal, round, and reactive to light  Conjunctivae and EOM are normal  Right eye exhibits no discharge  Left eye exhibits no discharge  Neck: Normal range of motion  No JVD present  Cardiovascular: Normal rate, regular rhythm, normal heart sounds and intact distal pulses  Exam reveals no gallop and no friction rub  No murmur heard  Pulmonary/Chest: Effort normal and breath sounds normal  No respiratory distress  He has no wheezes  He has no rales  Abdominal: Soft  Bowel sounds are normal  He exhibits no distension  There is no tenderness  There is no rebound and no guarding  Genitourinary:   Genitourinary Comments: No CVA tenderness    Musculoskeletal: He exhibits no edema or tenderness  Lymphadenopathy:     He has no cervical adenopathy  Neurological: He is alert and oriented to person, place, and time  He exhibits normal muscle tone  Skin: Skin is warm  No rash noted  He is not diaphoretic  Psychiatric: He has a normal mood and affect  Vitals reviewed

## 2019-09-23 NOTE — LETTER
September 23, 2019     Patient: Nova Blizzard   YOB: 1970   Date of Visit: 9/23/2019       To Whom it May Concern:    Nova Blizzard is under my professional care  He was seen in my office on 9/23/2019  He may return to work on 9/24/2019    Was advised to wear compression stockings at work  If you have any questions or concerns, please don't hesitate to call           Sincerely,          Maggie Parkinson MD        CC: No Recipients

## 2019-09-24 ENCOUNTER — TELEPHONE (OUTPATIENT)
Dept: FAMILY MEDICINE CLINIC | Facility: CLINIC | Age: 49
End: 2019-09-24

## 2020-06-05 ENCOUNTER — OFFICE VISIT (OUTPATIENT)
Dept: FAMILY MEDICINE CLINIC | Facility: CLINIC | Age: 50
End: 2020-06-05

## 2020-06-05 VITALS
HEART RATE: 73 BPM | HEIGHT: 72 IN | OXYGEN SATURATION: 98 % | WEIGHT: 200.4 LBS | BODY MASS INDEX: 27.14 KG/M2 | DIASTOLIC BLOOD PRESSURE: 88 MMHG | SYSTOLIC BLOOD PRESSURE: 140 MMHG | TEMPERATURE: 97.8 F | RESPIRATION RATE: 21 BRPM

## 2020-06-05 DIAGNOSIS — L73.9 FOLLICULITIS: Primary | ICD-10-CM

## 2020-06-05 DIAGNOSIS — B00.9 HERPES: ICD-10-CM

## 2020-06-05 DIAGNOSIS — S16.1XXA STRAIN OF NECK MUSCLE, INITIAL ENCOUNTER: ICD-10-CM

## 2020-06-05 PROCEDURE — 4004F PT TOBACCO SCREEN RCVD TLK: CPT | Performed by: PHYSICIAN ASSISTANT

## 2020-06-05 PROCEDURE — 3008F BODY MASS INDEX DOCD: CPT | Performed by: PHYSICIAN ASSISTANT

## 2020-06-05 PROCEDURE — 99214 OFFICE O/P EST MOD 30 MIN: CPT | Performed by: PHYSICIAN ASSISTANT

## 2020-06-05 RX ORDER — METHOCARBAMOL 500 MG/1
500 TABLET, FILM COATED ORAL 3 TIMES DAILY
Qty: 42 TABLET | Refills: 0 | Status: SHIPPED | OUTPATIENT
Start: 2020-06-05 | End: 2020-06-29

## 2020-06-05 RX ORDER — CLINDAMYCIN HYDROCHLORIDE 300 MG/1
300 CAPSULE ORAL 3 TIMES DAILY
Qty: 21 CAPSULE | Refills: 0 | Status: SHIPPED | OUTPATIENT
Start: 2020-06-05 | End: 2020-06-12 | Stop reason: SDUPTHER

## 2020-06-05 RX ORDER — VALACYCLOVIR HYDROCHLORIDE 500 MG/1
500 TABLET, FILM COATED ORAL DAILY
Qty: 90 TABLET | Refills: 0 | Status: SHIPPED | OUTPATIENT
Start: 2020-06-05 | End: 2021-05-31

## 2020-06-05 RX ORDER — NAPROXEN 500 MG/1
500 TABLET ORAL 2 TIMES DAILY WITH MEALS
Qty: 28 TABLET | Refills: 0 | Status: SHIPPED | OUTPATIENT
Start: 2020-06-05

## 2020-06-12 ENCOUNTER — TELEMEDICINE (OUTPATIENT)
Dept: FAMILY MEDICINE CLINIC | Facility: CLINIC | Age: 50
End: 2020-06-12

## 2020-06-12 DIAGNOSIS — L73.9 FOLLICULITIS: ICD-10-CM

## 2020-06-12 PROCEDURE — 99441 PR PHYS/QHP TELEPHONE EVALUATION 5-10 MIN: CPT | Performed by: PHYSICIAN ASSISTANT

## 2020-06-12 RX ORDER — CLINDAMYCIN HYDROCHLORIDE 300 MG/1
300 CAPSULE ORAL 3 TIMES DAILY
Qty: 21 CAPSULE | Refills: 0 | Status: SHIPPED | OUTPATIENT
Start: 2020-06-12 | End: 2020-06-19

## 2020-06-25 ENCOUNTER — APPOINTMENT (EMERGENCY)
Dept: RADIOLOGY | Facility: HOSPITAL | Age: 50
End: 2020-06-25
Payer: COMMERCIAL

## 2020-06-25 ENCOUNTER — HOSPITAL ENCOUNTER (EMERGENCY)
Facility: HOSPITAL | Age: 50
Discharge: HOME | End: 2020-06-25
Attending: EMERGENCY MEDICINE | Admitting: EMERGENCY MEDICINE
Payer: COMMERCIAL

## 2020-06-25 VITALS
WEIGHT: 201.72 LBS | RESPIRATION RATE: 18 BRPM | TEMPERATURE: 96.5 F | HEART RATE: 66 BPM | SYSTOLIC BLOOD PRESSURE: 127 MMHG | OXYGEN SATURATION: 99 % | BODY MASS INDEX: 27.36 KG/M2 | DIASTOLIC BLOOD PRESSURE: 82 MMHG

## 2020-06-25 DIAGNOSIS — R06.02 SOB (SHORTNESS OF BREATH): ICD-10-CM

## 2020-06-25 DIAGNOSIS — R07.9 CHEST PAIN: Primary | ICD-10-CM

## 2020-06-25 DIAGNOSIS — R20.0 NUMBNESS AND TINGLING SENSATION OF SKIN: ICD-10-CM

## 2020-06-25 DIAGNOSIS — R20.2 NUMBNESS AND TINGLING SENSATION OF SKIN: ICD-10-CM

## 2020-06-25 LAB
ANION GAP SERPL CALCULATED.3IONS-SCNC: 6 MMOL/L (ref 5–14)
BASOPHILS # BLD AUTO: 0.08 THOUSAND/UL (ref 0–0.1)
BASOPHILS NFR MAR MANUAL: 1 % (ref 0–1)
BUN SERPL-MCNC: 19 MG/DL (ref 5–25)
CALCIUM SERPL-MCNC: 9 MG/DL (ref 8.4–10.2)
CHLORIDE SERPL-SCNC: 107 MMOL/L (ref 97–108)
CO2 SERPL-SCNC: 24 MMOL/L (ref 22–30)
CREAT SERPL-MCNC: 1.08 MG/DL (ref 0.7–1.5)
ERYTHROCYTE [DISTWIDTH] IN BLOOD BY AUTOMATED COUNT: 13.8 %
GFR SERPL CREATININE-BSD FRML MDRD: 80 ML/MIN/1.73SQ M
GLUCOSE SERPL-MCNC: 133 MG/DL (ref 70–99)
HCT VFR BLD AUTO: 39.5 % (ref 41–53)
HGB BLD-MCNC: 13.6 G/DL (ref 13.5–17.5)
LYMPHOCYTES # BLD AUTO: 3.95 THOUSAND/UL (ref 0.5–4)
LYMPHOCYTES # BLD AUTO: 47 % (ref 25–45)
MCH RBC QN AUTO: 31.3 PG (ref 26–34)
MCHC RBC AUTO-ENTMCNC: 34.4 G/DL (ref 31–36)
MCV RBC AUTO: 91 FL (ref 80–100)
MONOCYTES # BLD AUTO: 0.5 THOUSAND/UL (ref 0.2–0.9)
MONOCYTES NFR BLD AUTO: 6 % (ref 1–10)
NEUTS SEG # BLD: 3.11 THOUSAND/UL (ref 1.8–7.8)
NEUTS SEG NFR BLD AUTO: 37 %
PLATELET # BLD AUTO: 210 THOUSANDS/UL (ref 150–450)
PLATELET BLD QL SMEAR: ADEQUATE
PMV BLD AUTO: 8.3 FL (ref 8.9–12.7)
POTASSIUM SERPL-SCNC: 3.7 MMOL/L (ref 3.6–5)
RBC # BLD AUTO: 4.33 MILLION/UL (ref 4.5–5.9)
RBC MORPH BLD: NORMAL
SODIUM SERPL-SCNC: 137 MMOL/L (ref 137–147)
TOTAL CELLS COUNTED SPEC: 100
TROPONIN I SERPL-MCNC: <0.01 NG/ML (ref 0–0.03)
VARIANT LYMPHS # BLD AUTO: 9 % (ref 0–0)
WBC # BLD AUTO: 8.4 THOUSAND/UL (ref 4.5–11)

## 2020-06-25 PROCEDURE — 71045 X-RAY EXAM CHEST 1 VIEW: CPT

## 2020-06-25 PROCEDURE — 36415 COLL VENOUS BLD VENIPUNCTURE: CPT | Performed by: PHYSICIAN ASSISTANT

## 2020-06-25 PROCEDURE — 93005 ELECTROCARDIOGRAM TRACING: CPT

## 2020-06-25 PROCEDURE — 99284 EMERGENCY DEPT VISIT MOD MDM: CPT | Performed by: PHYSICIAN ASSISTANT

## 2020-06-25 PROCEDURE — 80048 BASIC METABOLIC PNL TOTAL CA: CPT | Performed by: PHYSICIAN ASSISTANT

## 2020-06-25 PROCEDURE — 85027 COMPLETE CBC AUTOMATED: CPT | Performed by: PHYSICIAN ASSISTANT

## 2020-06-25 PROCEDURE — 99285 EMERGENCY DEPT VISIT HI MDM: CPT

## 2020-06-25 PROCEDURE — 84484 ASSAY OF TROPONIN QUANT: CPT | Performed by: PHYSICIAN ASSISTANT

## 2020-06-25 PROCEDURE — 85007 BL SMEAR W/DIFF WBC COUNT: CPT | Performed by: PHYSICIAN ASSISTANT

## 2020-06-25 RX ORDER — LORAZEPAM 0.5 MG/1
0.5 TABLET ORAL ONCE
Status: COMPLETED | OUTPATIENT
Start: 2020-06-25 | End: 2020-06-25

## 2020-06-25 RX ADMIN — LORAZEPAM 0.5 MG: 0.5 TABLET ORAL at 20:12

## 2020-06-26 LAB
ATRIAL RATE: 64 BPM
P AXIS: 44 DEGREES
PR INTERVAL: 132 MS
QRS AXIS: 78 DEGREES
QRSD INTERVAL: 94 MS
QT INTERVAL: 384 MS
QTC INTERVAL: 396 MS
T WAVE AXIS: 69 DEGREES
VENTRICULAR RATE: 64 BPM

## 2020-06-26 PROCEDURE — 93010 ELECTROCARDIOGRAM REPORT: CPT | Performed by: INTERNAL MEDICINE

## 2020-06-29 ENCOUNTER — HOSPITAL ENCOUNTER (OUTPATIENT)
Dept: RADIOLOGY | Facility: HOSPITAL | Age: 50
Discharge: HOME | End: 2020-06-29
Attending: ORTHOPAEDIC SURGERY
Payer: COMMERCIAL

## 2020-06-29 VITALS
HEART RATE: 71 BPM | BODY MASS INDEX: 27.09 KG/M2 | SYSTOLIC BLOOD PRESSURE: 126 MMHG | HEIGHT: 72 IN | DIASTOLIC BLOOD PRESSURE: 80 MMHG | WEIGHT: 200 LBS

## 2020-06-29 DIAGNOSIS — M75.42 SHOULDER IMPINGEMENT SYNDROME, LEFT: ICD-10-CM

## 2020-06-29 DIAGNOSIS — M54.2 NECK PAIN, ACUTE: ICD-10-CM

## 2020-06-29 DIAGNOSIS — M54.12 CERVICAL RADICULOPATHY: Primary | ICD-10-CM

## 2020-06-29 PROCEDURE — 99203 OFFICE O/P NEW LOW 30 MIN: CPT | Performed by: ORTHOPAEDIC SURGERY

## 2020-06-29 PROCEDURE — 4004F PT TOBACCO SCREEN RCVD TLK: CPT | Performed by: ORTHOPAEDIC SURGERY

## 2020-06-29 PROCEDURE — 3008F BODY MASS INDEX DOCD: CPT | Performed by: ORTHOPAEDIC SURGERY

## 2020-06-29 PROCEDURE — 72050 X-RAY EXAM NECK SPINE 4/5VWS: CPT

## 2020-06-29 PROCEDURE — 3008F BODY MASS INDEX DOCD: CPT | Performed by: PHYSICIAN ASSISTANT

## 2020-06-29 RX ORDER — METHYLPREDNISOLONE 4 MG/1
TABLET ORAL
Qty: 1 EACH | Refills: 0 | Status: SHIPPED | OUTPATIENT
Start: 2020-06-29

## 2020-12-05 ENCOUNTER — NURSE TRIAGE (OUTPATIENT)
Dept: OTHER | Facility: OTHER | Age: 50
End: 2020-12-05

## 2020-12-05 DIAGNOSIS — Z20.828 SARS-ASSOCIATED CORONAVIRUS EXPOSURE: ICD-10-CM

## 2020-12-05 DIAGNOSIS — Z20.828 SARS-ASSOCIATED CORONAVIRUS EXPOSURE: Primary | ICD-10-CM

## 2020-12-05 PROCEDURE — U0003 INFECTIOUS AGENT DETECTION BY NUCLEIC ACID (DNA OR RNA); SEVERE ACUTE RESPIRATORY SYNDROME CORONAVIRUS 2 (SARS-COV-2) (CORONAVIRUS DISEASE [COVID-19]), AMPLIFIED PROBE TECHNIQUE, MAKING USE OF HIGH THROUGHPUT TECHNOLOGIES AS DESCRIBED BY CMS-2020-01-R: HCPCS | Performed by: FAMILY MEDICINE

## 2020-12-07 LAB — SARS-COV-2 RNA SPEC QL NAA+PROBE: NOT DETECTED

## 2020-12-08 ENCOUNTER — TELEPHONE (OUTPATIENT)
Dept: FAMILY MEDICINE CLINIC | Facility: CLINIC | Age: 50
End: 2020-12-08

## 2025-03-30 ENCOUNTER — HOSPITAL ENCOUNTER (EMERGENCY)
Facility: HOSPITAL | Age: 55
Discharge: HOME | End: 2025-03-30
Attending: EMERGENCY MEDICINE
Payer: COMMERCIAL

## 2025-03-30 ENCOUNTER — APPOINTMENT (EMERGENCY)
Dept: RADIOLOGY | Facility: HOSPITAL | Age: 55
End: 2025-03-30
Payer: COMMERCIAL

## 2025-03-30 VITALS
SYSTOLIC BLOOD PRESSURE: 148 MMHG | OXYGEN SATURATION: 97 % | WEIGHT: 207.67 LBS | RESPIRATION RATE: 18 BRPM | HEIGHT: 72 IN | DIASTOLIC BLOOD PRESSURE: 72 MMHG | HEART RATE: 54 BPM | TEMPERATURE: 97.6 F | BODY MASS INDEX: 28.13 KG/M2

## 2025-03-30 DIAGNOSIS — R07.9 CHEST PAIN WITH LOW RISK OF ACUTE CORONARY SYNDROME: Primary | ICD-10-CM

## 2025-03-30 DIAGNOSIS — I10 HYPERTENSION: ICD-10-CM

## 2025-03-30 DIAGNOSIS — Z72.0 TOBACCO USE: ICD-10-CM

## 2025-03-30 DIAGNOSIS — E78.2 MIXED HYPERLIPIDEMIA: ICD-10-CM

## 2025-03-30 LAB
2HR DELTA HS TROPONIN: 0 NG/L
ALBUMIN SERPL BCG-MCNC: 4.6 G/DL (ref 3.5–5)
ALP SERPL-CCNC: 58 U/L (ref 34–104)
ALT SERPL W P-5'-P-CCNC: 18 U/L (ref 7–52)
ANION GAP SERPL CALCULATED.3IONS-SCNC: 8 MMOL/L (ref 4–13)
AST SERPL W P-5'-P-CCNC: 19 U/L (ref 13–39)
ATRIAL RATE: 70 BPM
BASOPHILS # BLD AUTO: 0.07 THOUSANDS/ÂΜL (ref 0–0.1)
BASOPHILS NFR BLD AUTO: 1 % (ref 0–1)
BILIRUB SERPL-MCNC: 0.32 MG/DL (ref 0.2–1)
BNP SERPL-MCNC: 53 PG/ML (ref 0–100)
BUN SERPL-MCNC: 13 MG/DL (ref 5–25)
CALCIUM SERPL-MCNC: 9.4 MG/DL (ref 8.4–10.2)
CARDIAC TROPONIN I PNL SERPL HS: 3 NG/L (ref ?–50)
CARDIAC TROPONIN I PNL SERPL HS: 3 NG/L (ref ?–50)
CHLORIDE SERPL-SCNC: 104 MMOL/L (ref 96–108)
CO2 SERPL-SCNC: 27 MMOL/L (ref 21–32)
CREAT SERPL-MCNC: 0.93 MG/DL (ref 0.6–1.3)
EOSINOPHIL # BLD AUTO: 0.17 THOUSAND/ÂΜL (ref 0–0.61)
EOSINOPHIL NFR BLD AUTO: 2 % (ref 0–6)
ERYTHROCYTE [DISTWIDTH] IN BLOOD BY AUTOMATED COUNT: 13.3 % (ref 11.6–15.1)
GFR SERPL CREATININE-BSD FRML MDRD: 92 ML/MIN/1.73SQ M
GLUCOSE SERPL-MCNC: 93 MG/DL (ref 65–140)
HCT VFR BLD AUTO: 44.3 % (ref 36.5–49.3)
HGB BLD-MCNC: 14.8 G/DL (ref 12–17)
IMM GRANULOCYTES # BLD AUTO: 0.03 THOUSAND/UL (ref 0–0.2)
IMM GRANULOCYTES NFR BLD AUTO: 0 % (ref 0–2)
LYMPHOCYTES # BLD AUTO: 3.54 THOUSANDS/ÂΜL (ref 0.6–4.47)
LYMPHOCYTES NFR BLD AUTO: 38 % (ref 14–44)
MCH RBC QN AUTO: 30 PG (ref 26.8–34.3)
MCHC RBC AUTO-ENTMCNC: 33.4 G/DL (ref 31.4–37.4)
MCV RBC AUTO: 90 FL (ref 82–98)
MONOCYTES # BLD AUTO: 0.83 THOUSAND/ÂΜL (ref 0.17–1.22)
MONOCYTES NFR BLD AUTO: 9 % (ref 4–12)
NEUTROPHILS # BLD AUTO: 4.68 THOUSANDS/ÂΜL (ref 1.85–7.62)
NEUTS SEG NFR BLD AUTO: 50 % (ref 43–75)
NRBC BLD AUTO-RTO: 0 /100 WBCS
P AXIS: 71 DEGREES
PLATELET # BLD AUTO: 255 THOUSANDS/UL (ref 149–390)
PMV BLD AUTO: 10 FL (ref 8.9–12.7)
POTASSIUM SERPL-SCNC: 3.3 MMOL/L (ref 3.5–5.3)
PR INTERVAL: 128 MS
PROT SERPL-MCNC: 8 G/DL (ref 6.4–8.4)
QRS AXIS: 78 DEGREES
QRSD INTERVAL: 98 MS
QT INTERVAL: 390 MS
QTC INTERVAL: 421 MS
RBC # BLD AUTO: 4.94 MILLION/UL (ref 3.88–5.62)
SODIUM SERPL-SCNC: 139 MMOL/L (ref 135–147)
T WAVE AXIS: 62 DEGREES
VENTRICULAR RATE: 70 BPM
WBC # BLD AUTO: 9.32 THOUSAND/UL (ref 4.31–10.16)

## 2025-03-30 PROCEDURE — 83880 ASSAY OF NATRIURETIC PEPTIDE: CPT | Performed by: EMERGENCY MEDICINE

## 2025-03-30 PROCEDURE — 99285 EMERGENCY DEPT VISIT HI MDM: CPT | Performed by: EMERGENCY MEDICINE

## 2025-03-30 PROCEDURE — 93010 ELECTROCARDIOGRAM REPORT: CPT | Performed by: INTERNAL MEDICINE

## 2025-03-30 PROCEDURE — 71046 X-RAY EXAM CHEST 2 VIEWS: CPT

## 2025-03-30 PROCEDURE — 36415 COLL VENOUS BLD VENIPUNCTURE: CPT | Performed by: EMERGENCY MEDICINE

## 2025-03-30 PROCEDURE — 84484 ASSAY OF TROPONIN QUANT: CPT | Performed by: EMERGENCY MEDICINE

## 2025-03-30 PROCEDURE — 93005 ELECTROCARDIOGRAM TRACING: CPT

## 2025-03-30 PROCEDURE — 85025 COMPLETE CBC W/AUTO DIFF WBC: CPT | Performed by: EMERGENCY MEDICINE

## 2025-03-30 PROCEDURE — 80053 COMPREHEN METABOLIC PANEL: CPT | Performed by: EMERGENCY MEDICINE

## 2025-03-30 PROCEDURE — 99285 EMERGENCY DEPT VISIT HI MDM: CPT

## 2025-03-30 RX ORDER — NAPROXEN 250 MG/1
250 TABLET ORAL
Qty: 21 TABLET | Refills: 0 | Status: SHIPPED | OUTPATIENT
Start: 2025-03-30 | End: 2025-04-06

## 2025-03-30 NOTE — ED PROVIDER NOTES
Time reflects when diagnosis was documented in both MDM as applicable and the Disposition within this note       Time User Action Codes Description Comment    3/30/2025  7:31 PM Zion Espinoza Add [R07.9] Chest pain with low risk of acute coronary syndrome     3/30/2025  7:32 PM Zion Espinoza Add [I10] Hypertension     3/30/2025  7:32 PM Zion Espinoza Add [Z72.0] Tobacco use     3/30/2025  7:32 PM Zion Espinoza Add [E78.2] Mixed hyperlipidemia           ED Disposition       ED Disposition   Discharge    Condition   Stable    Date/Time   Sun Mar 30, 2025  7:31 PM    Comment   Weston Masood discharge to home/self care.                   Assessment & Plan       Medical Decision Making  Chest pain w/o hx/exam findings to suggest pulmonary embolism and medical work up for this is not indicated, will do cardiac work up to r/o acs, pna, pnthx, chf, pericaridtis, myocarditis, prn pain meds, reassess    Amount and/or Complexity of Data Reviewed  Labs: ordered.  Radiology: ordered and independent interpretation performed.    Risk  Prescription drug management.        ED Course as of 03/30/25 1932   Sun Mar 30, 2025   1930 Felt the EKG and troponin are negative.  Patient is appropriate for discharge to home with outpatient follow-up.  Will refer to cardiology.       Medications - No data to display    ED Risk Strat Scores   HEART Risk Score      Flowsheet Row Most Recent Value   Heart Score Risk Calculator    History 0 Filed at: 03/30/2025 1817   ECG 0 Filed at: 03/30/2025 1817   Age 1 Filed at: 03/30/2025 1817   Risk Factors 1 Filed at: 03/30/2025 1817   Troponin 0 Filed at: 03/30/2025 1817   HEART Score 2 Filed at: 03/30/2025 1817          HEART Risk Score      Flowsheet Row Most Recent Value   Heart Score Risk Calculator    History 0 Filed at: 03/30/2025 1817   ECG 0 Filed at: 03/30/2025 1817   Age 1 Filed at: 03/30/2025 1817   Risk Factors 1 Filed at: 03/30/2025 1817   Troponin 0 Filed at: 03/30/2025 1817   HEART Score 2  "Filed at: 03/30/2025 1817                              SBIRT 20yo+      Flowsheet Row Most Recent Value   Initial Alcohol Screen: US AUDIT-C     1. How often do you have a drink containing alcohol? 0 Filed at: 03/30/2025 5286   2. How many drinks containing alcohol do you have on a typical day you are drinking?  0 Filed at: 03/30/2025 9696   3a. Male UNDER 65: How often do you have five or more drinks on one occasion? 0 Filed at: 03/30/2025 1539   Audit-C Score 0 Filed at: 03/30/2025 1539   VENKATESH: How many times in the past year have you...    Used an illegal drug or used a prescription medication for non-medical reasons? Never Filed at: 03/30/2025 0067                            History of Present Illness       Chief Complaint   Patient presents with    Chest Pain     Chest pain and SOB x one hour after patient had a cigarette - pt reports quitting smoking for one week until the past hour.       Past Medical History:   Diagnosis Date    Asthma       History reviewed. No pertinent surgical history.   History reviewed. No pertinent family history.   Social History     Tobacco Use    Smoking status: Every Day     Current packs/day: 1.00     Types: Cigarettes    Smokeless tobacco: Never   Substance Use Topics    Alcohol use: Yes     Comment: social    Drug use: Not Currently     Types: \"Crack\" cocaine, Heroin     Comment: quit using 2000      E-Cigarette/Vaping      E-Cigarette/Vaping Substances      I have reviewed and agree with the history as documented.     54 y/oo M presents for evaluation of sudden onset of L substernal chest pain after smoking a cigarette one hour ago. The pain is constant, nornradiating, and w/o modifying factors.  No associated sob, cough, uri sympotms, fevers, chills, alexey, calf pain, risk factors for dvt/pe, reflux symptoms, abbd pain, risk factors for dvt/pe.        History provided by:  Patient  Chest Pain  Associated symptoms: no abdominal pain, no fatigue, no fever, no nausea, no numbness, " no palpitations, no shortness of breath, not vomiting and no weakness        Review of Systems   Constitutional:  Negative for activity change, appetite change, fatigue and fever.   HENT:  Negative for congestion, dental problem, ear pain, rhinorrhea and sore throat.    Eyes:  Negative for pain and redness.   Respiratory:  Negative for chest tightness, shortness of breath and wheezing.    Cardiovascular:  Positive for chest pain. Negative for palpitations.   Gastrointestinal:  Negative for abdominal pain, blood in stool, constipation, diarrhea, nausea and vomiting.   Endocrine: Negative for cold intolerance and heat intolerance.   Genitourinary:  Negative for dysuria, frequency and hematuria.   Musculoskeletal:  Negative for arthralgias and myalgias.   Skin:  Negative for color change, pallor and rash.   Neurological:  Negative for weakness and numbness.   Hematological:  Does not bruise/bleed easily.   Psychiatric/Behavioral:  Negative for agitation, hallucinations and suicidal ideas.            Objective       ED Triage Vitals   Temperature Pulse Blood Pressure Respirations SpO2 Patient Position - Orthostatic VS   03/30/25 1540 03/30/25 1538 03/30/25 1538 03/30/25 1538 03/30/25 1538 03/30/25 1538   97.6 °F (36.4 °C) 77 (!) 177/86 20 100 % Lying      Temp Source Heart Rate Source BP Location FiO2 (%) Pain Score    03/30/25 1540 03/30/25 1538 03/30/25 1538 -- --    Oral Monitor Left arm        Vitals      Date and Time Temp Pulse SpO2 Resp BP Pain Score FACES Pain Rating User   03/30/25 1700 -- 59 99 % 18 159/75 -- -- AF   03/30/25 1540 97.6 °F (36.4 °C) -- -- -- -- -- -- AS   03/30/25 1538 -- 77 100 % 20 177/86 -- -- AS            Physical Exam  Constitutional:       Appearance: He is well-developed.   HENT:      Head: Normocephalic and atraumatic.   Eyes:      General: No scleral icterus.     Conjunctiva/sclera: Conjunctivae normal.   Neck:      Vascular: No JVD.      Trachea: No tracheal deviation.    Cardiovascular:      Rate and Rhythm: Normal rate and regular rhythm.   Pulmonary:      Effort: Pulmonary effort is normal. No respiratory distress.      Breath sounds: Normal breath sounds.   Chest:      Chest wall: No tenderness.   Abdominal:      General: There is no distension.   Musculoskeletal:         General: No deformity. Normal range of motion.      Cervical back: Normal range of motion.   Skin:     Coloration: Skin is not pale.      Findings: No erythema or rash.   Neurological:      Mental Status: He is alert and oriented to person, place, and time.   Psychiatric:         Behavior: Behavior normal.         Results Reviewed       Procedure Component Value Units Date/Time    HS Troponin I 2hr [[id number]]  (Normal) Collected: 03/30/25 1834    Lab Status: Final result Specimen: Blood from Arm, Right Updated: 03/30/25 1913     hs TnI 2hr 3 ng/L      Delta 2hr hsTnI 0 ng/L     B-Type Natriuretic Peptide(BNP) [[id number]]  (Normal) Collected: 03/30/25 1616    Lab Status: Final result Specimen: Blood from Arm, Right Updated: 03/30/25 1718     BNP 53 pg/mL     HS Troponin 0hr (reflex protocol) [[id number]]  (Normal) Collected: 03/30/25 1616    Lab Status: Final result Specimen: Blood from Arm, Right Updated: 03/30/25 1652     hs TnI 0hr 3 ng/L     Comprehensive metabolic panel [[id number]]  (Abnormal) Collected: 03/30/25 1616    Lab Status: Final result Specimen: Blood from Arm, Right Updated: 03/30/25 1644     Sodium 139 mmol/L      Potassium 3.3 mmol/L      Chloride 104 mmol/L      CO2 27 mmol/L      ANION GAP 8 mmol/L      BUN 13 mg/dL      Creatinine 0.93 mg/dL      Glucose 93 mg/dL      Calcium 9.4 mg/dL      AST 19 U/L      ALT 18 U/L      Alkaline Phosphatase 58 U/L      Total Protein 8.0 g/dL      Albumin 4.6 g/dL      Total Bilirubin 0.32 mg/dL      eGFR 92 ml/min/1.73sq m     Narrative:      [employer] guidelines for Chronic Kidney Disease (CKD):     Stage 1 with normal or high  GFR (GFR > 90 mL/min/1.73 square meters)    Stage 2 Mild CKD (GFR = 60-89 mL/min/1.73 square meters)    Stage 3A Moderate CKD (GFR = 45-59 mL/min/1.73 square meters)    Stage 3B Moderate CKD (GFR = 30-44 mL/min/1.73 square meters)    Stage 4 Severe CKD (GFR = 15-29 mL/min/1.73 square meters)    Stage 5 End Stage CKD (GFR <15 mL/min/1.73 square meters)  Note: GFR calculation is accurate only with a steady state creatinine    CBC and differential [[id number]] Collected: 03/30/25 1616    Lab Status: Final result Specimen: Blood from Arm, Right Updated: 03/30/25 1625     WBC 9.32 Thousand/uL      RBC 4.94 Million/uL      Hemoglobin 14.8 g/dL      Hematocrit 44.3 %      MCV 90 fL      MCH 30.0 pg      MCHC 33.4 g/dL      RDW 13.3 %      MPV 10.0 fL      Platelets 255 Thousands/uL      nRBC 0 /100 WBCs      Segmented % 50 %      Immature Grans % 0 %      Lymphocytes % 38 %      Monocytes % 9 %      Eosinophils Relative 2 %      Basophils Relative 1 %      Absolute Neutrophils 4.68 Thousands/µL      Absolute Immature Grans 0.03 Thousand/uL      Absolute Lymphocytes 3.54 Thousands/µL      Absolute Monocytes 0.83 Thousand/µL      Eosinophils Absolute 0.17 Thousand/µL      Basophils Absolute 0.07 Thousands/µL             XR chest 2 views   ED Interpretation by Zion Espinoza MD (03/30 1806)   Primary reviewed: No acute abnormality          ECG 12 Lead Documentation Only    Date/Time: 3/30/2025 3:45 PM    Performed by: Zion Espinoza MD  Authorized by: Zion Espinoza MD    ECG reviewed by me, the ED Provider: yes    Patient location:  ED  Rate:     ECG rate:  70    ECG rate assessment: normal    Rhythm:     Rhythm: sinus rhythm    Ectopy:     Ectopy: none    QRS:     QRS axis:  Normal    QRS intervals:  Normal  Conduction:     Conduction: abnormal      Abnormal conduction: incomplete RBBB    ST segments:     ST segments:  Normal  T waves:     T waves: normal        ED Medication and Procedure Management   Prior to  Admission Medications   Prescriptions Last Dose Informant Patient Reported? Taking?   methocarbamol (ROBAXIN) 500 mg tablet   No No   Sig: Take 1 tablet (500 mg total) by mouth 3 (three) times a day for 14 days   methylPREDNISolone 4 MG tablet therapy pack   No No   Sig: Use as directed on package   mupirocin (BACTROBAN) 2 % ointment   No No   Sig: Apply topically 3 (three) times a day   naproxen (NAPROSYN) 500 mg tablet   No No   Sig: Take 1 tablet (500 mg total) by mouth 2 (two) times a day with meals   triamcinolone (KENALOG) 0.1 % cream   No No   Sig: Apply topically 2 (two) times a day   valACYclovir (VALTREX) 500 mg tablet   No No   Sig: Take 1 tablet (500 mg total) by mouth daily      Facility-Administered Medications: None     Patient's Medications   Discharge Prescriptions    NAPROXEN (NAPROSYN) 250 MG TABLET    Take 1 tablet (250 mg total) by mouth 3 (three) times a day with meals for 7 days       Start Date: 3/30/2025 End Date: 4/6/2025       Order Dose: 250 mg       Quantity: 21 tablet    Refills: 0       ED SEPSIS DOCUMENTATION   Time reflects when diagnosis was documented in both MDM as applicable and the Disposition within this note       Time User Action Codes Description Comment    3/30/2025  7:31 PM Zion Espinoza [R07.9] Chest pain with low risk of acute coronary syndrome     3/30/2025  7:32 PM Zion Espinoza [I10] Hypertension     3/30/2025  7:32 PM Zion Espinoza [Z72.0] Tobacco use     3/30/2025  7:32 PM Zion Espinoza [E78.2] Mixed hyperlipidemia                  Zion Espinoza MD  03/30/25 1932

## 2025-03-31 LAB
ATRIAL RATE: 53 BPM
P AXIS: 60 DEGREES
PR INTERVAL: 134 MS
QRS AXIS: 78 DEGREES
QRSD INTERVAL: 88 MS
QT INTERVAL: 432 MS
QTC INTERVAL: 405 MS
T WAVE AXIS: 62 DEGREES
VENTRICULAR RATE: 53 BPM

## 2025-03-31 PROCEDURE — 93010 ELECTROCARDIOGRAM REPORT: CPT | Performed by: INTERNAL MEDICINE

## 2025-07-28 ENCOUNTER — TELEPHONE (OUTPATIENT)
Age: 55
End: 2025-07-28